# Patient Record
Sex: MALE | Race: BLACK OR AFRICAN AMERICAN | NOT HISPANIC OR LATINO | URBAN - METROPOLITAN AREA
[De-identification: names, ages, dates, MRNs, and addresses within clinical notes are randomized per-mention and may not be internally consistent; named-entity substitution may affect disease eponyms.]

---

## 2022-01-23 ENCOUNTER — INPATIENT (INPATIENT)
Facility: HOSPITAL | Age: 42
LOS: 8 days | Discharge: HOME | End: 2022-02-01
Attending: INTERNAL MEDICINE | Admitting: INTERNAL MEDICINE
Payer: COMMERCIAL

## 2022-01-23 VITALS
HEART RATE: 130 BPM | TEMPERATURE: 100 F | OXYGEN SATURATION: 100 % | RESPIRATION RATE: 20 BRPM | SYSTOLIC BLOOD PRESSURE: 169 MMHG | DIASTOLIC BLOOD PRESSURE: 91 MMHG

## 2022-01-23 DIAGNOSIS — Z29.9 ENCOUNTER FOR PROPHYLACTIC MEASURES, UNSPECIFIED: ICD-10-CM

## 2022-01-23 DIAGNOSIS — K92.2 GASTROINTESTINAL HEMORRHAGE, UNSPECIFIED: ICD-10-CM

## 2022-01-23 DIAGNOSIS — D62 ACUTE POSTHEMORRHAGIC ANEMIA: ICD-10-CM

## 2022-01-23 DIAGNOSIS — R00.0 TACHYCARDIA, UNSPECIFIED: ICD-10-CM

## 2022-01-23 LAB
ALBUMIN SERPL ELPH-MCNC: 4.1 G/DL — SIGNIFICANT CHANGE UP (ref 3.3–5)
ALP SERPL-CCNC: 41 U/L — SIGNIFICANT CHANGE UP (ref 40–120)
ALT FLD-CCNC: 13 U/L — SIGNIFICANT CHANGE UP (ref 4–41)
ANION GAP SERPL CALC-SCNC: 11 MMOL/L — SIGNIFICANT CHANGE UP (ref 7–14)
APTT BLD: 26.8 SEC — LOW (ref 27–36.3)
AST SERPL-CCNC: 17 U/L — SIGNIFICANT CHANGE UP (ref 4–40)
BASOPHILS # BLD AUTO: 0.05 K/UL — SIGNIFICANT CHANGE UP (ref 0–0.2)
BASOPHILS NFR BLD AUTO: 0.9 % — SIGNIFICANT CHANGE UP (ref 0–2)
BILIRUB SERPL-MCNC: 0.3 MG/DL — SIGNIFICANT CHANGE UP (ref 0.2–1.2)
BLD GP AB SCN SERPL QL: NEGATIVE — SIGNIFICANT CHANGE UP
BUN SERPL-MCNC: 11 MG/DL — SIGNIFICANT CHANGE UP (ref 7–23)
CALCIUM SERPL-MCNC: 8.3 MG/DL — LOW (ref 8.4–10.5)
CHLORIDE SERPL-SCNC: 100 MMOL/L — SIGNIFICANT CHANGE UP (ref 98–107)
CO2 SERPL-SCNC: 27 MMOL/L — SIGNIFICANT CHANGE UP (ref 22–31)
CREAT SERPL-MCNC: 1.28 MG/DL — SIGNIFICANT CHANGE UP (ref 0.5–1.3)
EOSINOPHIL # BLD AUTO: 0.1 K/UL — SIGNIFICANT CHANGE UP (ref 0–0.5)
EOSINOPHIL NFR BLD AUTO: 1.8 % — SIGNIFICANT CHANGE UP (ref 0–6)
FLUAV AG NPH QL: SIGNIFICANT CHANGE UP
FLUBV AG NPH QL: SIGNIFICANT CHANGE UP
GLUCOSE SERPL-MCNC: 101 MG/DL — HIGH (ref 70–99)
HCT VFR BLD CALC: 17.6 % — CRITICAL LOW (ref 39–50)
HGB BLD-MCNC: 5.8 G/DL — CRITICAL LOW (ref 13–17)
IANC: 3.97 K/UL — SIGNIFICANT CHANGE UP (ref 1.5–8.5)
INR BLD: 1.05 RATIO — SIGNIFICANT CHANGE UP (ref 0.88–1.16)
LYMPHOCYTES # BLD AUTO: 0.34 K/UL — LOW (ref 1–3.3)
LYMPHOCYTES # BLD AUTO: 6.3 % — LOW (ref 13–44)
MAGNESIUM SERPL-MCNC: 1.7 MG/DL — SIGNIFICANT CHANGE UP (ref 1.6–2.6)
MCHC RBC-ENTMCNC: 26.4 PG — LOW (ref 27–34)
MCHC RBC-ENTMCNC: 33 GM/DL — SIGNIFICANT CHANGE UP (ref 32–36)
MCV RBC AUTO: 80 FL — SIGNIFICANT CHANGE UP (ref 80–100)
MONOCYTES # BLD AUTO: 0.15 K/UL — SIGNIFICANT CHANGE UP (ref 0–0.9)
MONOCYTES NFR BLD AUTO: 2.7 % — SIGNIFICANT CHANGE UP (ref 2–14)
NEUTROPHILS # BLD AUTO: 4.51 K/UL — SIGNIFICANT CHANGE UP (ref 1.8–7.4)
NEUTROPHILS NFR BLD AUTO: 82.9 % — HIGH (ref 43–77)
NT-PROBNP SERPL-SCNC: 14 PG/ML — SIGNIFICANT CHANGE UP
PLATELET # BLD AUTO: 357 K/UL — SIGNIFICANT CHANGE UP (ref 150–400)
POTASSIUM SERPL-MCNC: 3.4 MMOL/L — LOW (ref 3.5–5.3)
POTASSIUM SERPL-SCNC: 3.4 MMOL/L — LOW (ref 3.5–5.3)
PROT SERPL-MCNC: 6.4 G/DL — SIGNIFICANT CHANGE UP (ref 6–8.3)
PROTHROM AB SERPL-ACNC: 12.1 SEC — SIGNIFICANT CHANGE UP (ref 10.6–13.6)
RBC # BLD: 2.2 M/UL — LOW (ref 4.2–5.8)
RBC # FLD: 18 % — HIGH (ref 10.3–14.5)
RH IG SCN BLD-IMP: POSITIVE — SIGNIFICANT CHANGE UP
RH IG SCN BLD-IMP: POSITIVE — SIGNIFICANT CHANGE UP
RSV RNA NPH QL NAA+NON-PROBE: SIGNIFICANT CHANGE UP
SARS-COV-2 RNA SPEC QL NAA+PROBE: SIGNIFICANT CHANGE UP
SODIUM SERPL-SCNC: 138 MMOL/L — SIGNIFICANT CHANGE UP (ref 135–145)
TROPONIN T, HIGH SENSITIVITY RESULT: 10 NG/L — SIGNIFICANT CHANGE UP
TROPONIN T, HIGH SENSITIVITY RESULT: 10 NG/L — SIGNIFICANT CHANGE UP
WBC # BLD: 5.44 K/UL — SIGNIFICANT CHANGE UP (ref 3.8–10.5)
WBC # FLD AUTO: 5.44 K/UL — SIGNIFICANT CHANGE UP (ref 3.8–10.5)

## 2022-01-23 PROCEDURE — 99223 1ST HOSP IP/OBS HIGH 75: CPT | Mod: GC

## 2022-01-23 PROCEDURE — 99291 CRITICAL CARE FIRST HOUR: CPT | Mod: 25

## 2022-01-23 PROCEDURE — 93010 ELECTROCARDIOGRAM REPORT: CPT

## 2022-01-23 PROCEDURE — 71275 CT ANGIOGRAPHY CHEST: CPT | Mod: 26,MA

## 2022-01-23 RX ORDER — PANTOPRAZOLE SODIUM 20 MG/1
40 TABLET, DELAYED RELEASE ORAL
Refills: 0 | Status: DISCONTINUED | OUTPATIENT
Start: 2022-01-23 | End: 2022-01-26

## 2022-01-23 RX ORDER — ACETAMINOPHEN 500 MG
650 TABLET ORAL EVERY 6 HOURS
Refills: 0 | Status: DISCONTINUED | OUTPATIENT
Start: 2022-01-23 | End: 2022-01-31

## 2022-01-23 RX ORDER — PANTOPRAZOLE SODIUM 20 MG/1
80 TABLET, DELAYED RELEASE ORAL ONCE
Refills: 0 | Status: COMPLETED | OUTPATIENT
Start: 2022-01-23 | End: 2022-01-23

## 2022-01-23 RX ORDER — INFLUENZA VIRUS VACCINE 15; 15; 15; 15 UG/.5ML; UG/.5ML; UG/.5ML; UG/.5ML
0.5 SUSPENSION INTRAMUSCULAR ONCE
Refills: 0 | Status: DISCONTINUED | OUTPATIENT
Start: 2022-01-23 | End: 2022-02-01

## 2022-01-23 RX ORDER — LANOLIN ALCOHOL/MO/W.PET/CERES
3 CREAM (GRAM) TOPICAL AT BEDTIME
Refills: 0 | Status: DISCONTINUED | OUTPATIENT
Start: 2022-01-23 | End: 2022-01-24

## 2022-01-23 RX ADMIN — PANTOPRAZOLE SODIUM 80 MILLIGRAM(S): 20 TABLET, DELAYED RELEASE ORAL at 16:40

## 2022-01-23 NOTE — ED ADULT TRIAGE NOTE - CHIEF COMPLAINT QUOTE
states" I am feeling winded and dizzy on exertion with high heart rate since 1 week and is getting worst" states  HR is increasing to 150 on exertion . denies any pain c/o chest tightness. "

## 2022-01-23 NOTE — H&P ADULT - ATTENDING COMMENTS
41 y.o. with PMH of gastric ulcers, internal hemorrohoids who presents to ED with palpitations and dyspnea x 1 week in the setting of symptomatic anemia in the setting of LGIB. ROS positive for worsening RUTLEDGE and tachycardia, which has made it difficult for him to ambulate. Patient reports that he has had a HA over the past week and consuquently has been taking advil 400-600 mg tablets once a day over the past week.     Patient states that over the past 2 weeks, he notices that he continues to bleed after having a BM, and even feels that blood "squirts out." He states the stool itself appears brown. He has about 1 BM per day. No emesis. He has had several past instances of BRBPR in the past, but he states that past episodes have never persisted this long, i.e. 2 weeks. Denies pain associated with bleeding. Was taking nexium in the past after his diagnosis of ulcers, but no longer taking it. Past colonoscopies have demonstrated no polyps but did reveal internal hemorrhoids.     In the ED, patient was found to be tachycardic with HR of 130s. EKG was consistent with sinus tachycardia with EKG demonstrating diffuse TWI. Troponin remained flat at 10. Hgb was found to be 5.8. Patient states he does not follow with a PCP and is unsure what his baseline hgb is.      #LGIB:   -acute blood loss anemia 2/2 LGIB  -given BRBPB, but unable to r/o contribution by UGIB given hx of ulcers and recent NSAID use.   -continue protonix IV BID  -GI c/s for possible EGD and colonoscopy  -sp 2 units in ED, obtain post transfusion CBC  -CBC q8h    #SOB:   -suspect in setting of symptomatic anemia  -troponin negative  -add on pro-BNP      #DVT ppx:   -hold pharmacologic ppx for now, SCDs 41 y.o. with PMH of gastric ulcers, internal hemorrhoids who presents to ED with palpitations and dyspnea x 1 week in the setting of symptomatic anemia in the setting of LGIB. ROS positive for worsening RUTLEDGE and tachycardia, which has made it difficult for him to ambulate. Patient reports that he has had a HA over the past week and consequently has been taking advil 400-600 mg tablets once a day over the past week.     Patient states that over the past 2 weeks, he notices that he continues to bleed after having a BM, and even feels that blood "squirts out." He states the stool itself appears brown. He has about 1 BM per day. No emesis. He has had several past instances of BRBPR in the past, but he states that past episodes have never persisted this long, i.e. 2 weeks. Denies pain associated with bleeding. Was taking nexium in the past after his diagnosis of ulcers, but no longer taking it. Past colonoscopies have demonstrated no polyps but did reveal internal hemorrhoids.     In the ED, patient was found to be tachycardic with HR of 130s. EKG was consistent with sinus tachycardia with EKG demonstrating diffuse TWI. Troponin remained flat at 10. Hgb was found to be 5.8. Patient states he does not follow with a PCP and is unsure what his baseline hgb is.      #LGIB:   -acute blood loss anemia 2/2 LGIB  -given BRBPB, but unable to r/o contribution by UGIB given hx of ulcers and recent NSAID use.   -continue protonix IV BID  -GI c/s for possible EGD and colonoscopy  -sp 2 units in ED, obtain post transfusion CBC  -CBC q8h    #SOB:   -suspect in setting of symptomatic anemia  -troponin negative  -add on pro-BNP      #DVT ppx:   -hold pharmacologic ppx for now, SCDs

## 2022-01-23 NOTE — H&P ADULT - PROBLEM SELECTOR PLAN 1
Symptomatic anemia with Hgb of 5.9. Likely from GIB, likely lower, but cannot rule out upper GIB. No hx of anemia, and no family hx of anemia.   - 2u pRBC now  - F/u post transfusion CBC, goal Hgb >7  - Trend CBC q8h  - Anemia workup: Check Folate, B12, retic count, haptoglobin. Hold off checking iron studies given currently receiving blood transfusion  - Maintain active T+S

## 2022-01-23 NOTE — H&P ADULT - HISTORY OF PRESENT ILLNESS
42 y/o M with no significant PMH presenting to the ED c/o tachycardia, SOB, and lightheadedness. 40 y/o M with no significant PMH presenting to the ED c/o tachycardia, SOB, and lightheadedness. Over the last 2 weeks, patient has been experiencing increasing fatigue, exercise intolerance and lightheadedness. Pt also noted his HR to be in 100s when it's usually 60s. During the past 2 weeks, patient also noted significant bleeding from rectum every time he has a bowel movement. Per patient, he has an internal hemorrhoid which he has to manually reduce everytime he has a BM. Stools are brown in color and normal in consistency; no straining or pain on defecation. Blood in toilet bowl bright red, no dark blood or clots noted. No blood mixed in stool. Patient also noted to take advil consistently everyday for the past week for headache. Denies abdominal pain, nausea, or vomiting. No hematemesis. Does endorse heartburn symptoms but takes no medications. Patient had colonoscopy in the past for rectal bleeding but says previous scopes have been unremarkable. Also had upper endoscopies in past, most recent in May 2021 which showed erosions, but no ulcers. No cigarette smoking but vapes daily. Occasional alcohol use. No family hx of bleeding disorders, but mother with hx of breast and colon ca and father passed away from MI.     In ED, patient noted to be anemic with hgb to 5.8. Also noted to have sinus tachycardia. EKG showing TWI in lateral leads. CTA negative for PE, clear lungs. Patient receiving 2u PRBCs.

## 2022-01-23 NOTE — ED PROVIDER NOTE - OBJECTIVE STATEMENT
40 y/o M with no significant PMH presenting to the ED c/o tachycardia, SOB, and lightheadedness. Reports over the past week having difficulty ambulating up his stairs (4 stories) secondary to shortness of breath. Today he was unable to make it all the way to the top without his symptoms worsening. He endorses his heart rate increasing to 150 even at rest throughout the week. Denies CP. Denies fever, chills, N/V. Father with MI in his 60s. No hx of blood clots. No recent travel. +vapes 42 y/o M with no significant PMH presenting to the ED c/o tachycardia, SOB, and lightheadedness. Reports over the past week having difficulty ambulating up his stairs (4 stories) secondary to shortness of breath. Today he was unable to make it all the way to the top without his symptoms worsening. He endorses his heart rate increasing to 150 even at rest throughout the week. Denies CP. Denies fever, chills, N/V. Father with MI in his 60s. No hx of blood clots. No recent travel. +madiha    Attendinyo male presents with shortness of breath and decreased exercise tolerance for the past 2 weeks.  also reports elevated heart rate.  usual resting heart rate is 50s.  no fever or chills.  no nausea or vomiting.  no cough or URI symptoms.  tolerating po well.

## 2022-01-23 NOTE — H&P ADULT - NSHPPHYSICALEXAM_GEN_ALL_CORE
PHYSICAL EXAM:  GENERAL: NAD, well-groomed, well-developed  HEAD:  Atraumatic, Normocephalic  EYES: EOMI, PERRLA, conjunctiva and sclera clear  ENMT: No tonsillar erythema, exudates, or enlargement; Moist mucous membranes, Good dentition, No lesions  NECK: Supple, No JVD, Normal thyroid  CHEST/LUNG: Clear to percussion bilaterally; No rales, rhonchi, wheezing, or rubs  HEART: Regular rate and rhythm; No murmurs, rubs, or gallops  ABDOMEN: Soft, Nontender, Nondistended; Bowel sounds present  VASCULAR:  2+ Peripheral Pulses, No clubbing, cyanosis, or edema  LYMPH: No lymphadenopathy noted  SKIN: No rashes or lesions  NERVOUS SYSTEM:  Alert & Oriented X3, Good concentration; Motor Strength 5/5 B/L upper and lower extremities; DTRs 2+ intact and symmetric PHYSICAL EXAM:  GENERAL: NAD, well-groomed, well-developed  HEAD:  Atraumatic, Normocephalic  EYES: EOMI, PERRLA, conjunctiva and sclera clear  ENMT: No tonsillar erythema, exudates, or enlargement; Moist mucous membranes  NECK: Supple, No JVD, Normal thyroid  CHEST/LUNG: Clear to auscultation bilaterally; No rales, rhonchi, wheezing, or rubs  HEART: Tachycardic but normal rhythm; No murmurs, rubs, or gallops  ABDOMEN: Soft, Nontender, Nondistended; Bowel sounds present  VASCULAR:  2+ Peripheral Pulses, No clubbing, cyanosis, or edema  SKIN: No rashes or lesions  NERVOUS SYSTEM:  Alert & Oriented X3  Rectal exam: No external hemorrhoids noted, no bleeding, no fissures

## 2022-01-23 NOTE — H&P ADULT - PROBLEM SELECTOR PLAN 3
Likely in setting of GIB, however TWI in lateral leads on EKG. Father with hx of MI  - Low suspicion for ACS, troponins flat  - Will check pro-BNP  - No need for echo at this time  - Suspect improvement after transfusion

## 2022-01-23 NOTE — H&P ADULT - NSHPLABSRESULTS_GEN_ALL_CORE
01-23    138  |  100  |  11  ----------------------------<  101<H>  3.4<L>   |  27  |  1.28    Ca    8.3<L>      23 Jan 2022 15:14  Mg     1.70     01-23    TPro  6.4  /  Alb  4.1  /  TBili  0.3  /  DBili  x   /  AST  17  /  ALT  13  /  AlkPhos  41  01-23    Magnesium, Serum: 1.70 mg/dL (01-23-22 @ 15:14)        PT/INR - ( 23 Jan 2022 15:14 )   PT: 12.1 sec;   INR: 1.05 ratio         PTT - ( 23 Jan 2022 15:14 )  PTT:26.8 sec                                        5.8    5.44  )-----------( 357      ( 23 Jan 2022 15:14 )             17.6     CAPILLARY BLOOD GLUCOSE

## 2022-01-23 NOTE — ED PROVIDER NOTE - CLINICAL SUMMARY MEDICAL DECISION MAKING FREE TEXT BOX
42 y/o M with no significant PMH to the ED c/o SOB, palpitations, lightheadedness. Patient is tachycardic upon arrival. EKG with diffuse TWI. Concern for PE vs other cardiac pathology. Plan for labs and CTA to evaluate for PE. In setting of negative workup will likely CDU vs admit for echo and further eval with TWI. Jun Ozuna, DO PGY3

## 2022-01-23 NOTE — H&P ADULT - PROBLEM SELECTOR PLAN 4
DVT ppx: Hold off for now given GIB  Diet: CLD in case need for scope  Code: Full  Dispo: likely home pending improvement

## 2022-01-23 NOTE — PATIENT PROFILE ADULT - FALL HARM RISK - UNIVERSAL INTERVENTIONS
Bed in lowest position, wheels locked, appropriate side rails in place/Call bell, personal items and telephone in reach/Instruct patient to call for assistance before getting out of bed or chair/Non-slip footwear when patient is out of bed/[place] to call system/Physically safe environment - no spills, clutter or unnecessary equipment/Purposeful Proactive Rounding/Room/bathroom lighting operational, light cord in reach

## 2022-01-23 NOTE — ED PROVIDER NOTE - PROGRESS NOTE DETAILS
Labs with HGB 5.8, patient endorses significant bleeding from a hemorrhoid and also a gastric ulcer. He endorses using NSAIDs at home. CTA negative for PE. Will transfuse and admit for GI workup and echo in setting of TWI. Jun Ozuna, DO PGY3

## 2022-01-23 NOTE — H&P ADULT - NSHPREVIEWOFSYSTEMS_GEN_ALL_CORE
REVIEW OF SYSTEMS:  CONSTITUTIONAL: No fever, chills, night sweats, or fatigue  EYES: No eye pain, visual disturbances, or discharge  ENMT:  No difficulty hearing, tinnitus, vertigo; No sinus or throat pain  NECK: No pain or stiffness  RESPIRATORY: No cough, wheezing, or hemoptysis; No shortness of breath  CARDIOVASCULAR: No chest pain, palpitations, dizziness, or leg swelling  GASTROINTESTINAL: No abdominal or epigastric pain. No nausea, vomiting, or hematemesis; No diarrhea or constipation. No melena or hematochezia.  GENITOURINARY: No dysuria, frequency, hematuria, or incontinence  NEUROLOGICAL: No headaches, memory loss, loss of strength, numbness, or tremors  SKIN: No itching, burning, rashes, or lesions   LYMPH NODES: No enlarged glands  ENDOCRINE: No heat or cold intolerance; No hair loss  MUSCULOSKELETAL: No joint pain or swelling; No muscle, back, or extremity pain  PSYCHIATRIC: No depression, anxiety, mood swings, or difficulty sleeping  HEME/LYMPH: No easy bruising, or bleeding gums REVIEW OF SYSTEMS:  CONSTITUTIONAL: No fever, chills, night sweats, or fatigue  EYES: No eye pain, visual disturbances, or discharge  ENMT:  No difficulty hearing, tinnitus, vertigo; No sinus or throat pain  NECK: No pain or stiffness  RESPIRATORY: No cough, wheezing, or hemoptysis; + SOB on exertion  CARDIOVASCULAR: No chest pain, but + palpitations, dizziness, and lightheadedness. No LE swelling   GASTROINTESTINAL: No abdominal or epigastric pain. No nausea, vomiting, or hematemesis; No diarrhea or constipation. + BRBPR during bowel movements  GENITOURINARY: No dysuria, frequency, hematuria, or incontinence  NEUROLOGICAL: No headaches, memory loss, loss of strength, numbness, or tremors  SKIN: No itching, burning, rashes, or lesions   MUSCULOSKELETAL: No joint pain or swelling; No muscle, back, or extremity pain  HEME/LYMPH: No easy bruising, or bleeding gums

## 2022-01-23 NOTE — ED ADULT NURSE REASSESSMENT NOTE - NS ED NURSE REASSESS COMMENT FT1
Rcvd report from Break RN: pt. endorsing SOB and dyspnea on exertion worsening over the past 2 weeks. Also states persistent tachycardia, which is not his baseline. Came to the ED when he started feeling lightheaded, dizzy and lethargic. Pt. had CT Angio done, pending results.

## 2022-01-23 NOTE — H&P ADULT - ASSESSMENT
41 M with no significant PMHx presenting with SOB, lightheadedness, and palpitations, and noted to be anemic in ED with Hgb 5.8. Source of bleed likely due to lower GIB from internal hemorrhoid however cannot rule out chronic upper GIB at this time. Patient admitted for symptomatic anemia from acute blood loss from GIB.

## 2022-01-23 NOTE — ED PROVIDER NOTE - CARE PLAN
1 Principal Discharge DX:	GI bleed  Secondary Diagnosis:	Anemia  Secondary Diagnosis:	Abnormal EKG

## 2022-01-23 NOTE — H&P ADULT - NSHPSOCIALHISTORY_GEN_ALL_CORE
Social History:    Marital Status:  (   )    (   ) Single    (   )    (  )   Occupation:   Lives with: (  ) alone  (  ) children   (  ) spouse   (  ) parents  (  ) other    Substance Use (street drugs): (  ) never used  (  ) other:  Tobacco Usage:  (   ) never smoked   (   ) former smoker   (   ) current smoker  (     ) pack year  (        ) last cigarette date  Alcohol Usage:  Sexual History: Social History:    Marital Status:  ( X )    (   ) Single    (   )    (  )   Occupation: [employer]  Lives with: (  ) alone  ( X ) children   ( X ) spouse   (  ) parents  (  ) other    Substance Use (street drugs): ( X ) never used  (  ) other:  Tobacco Usage: Does not smoke cigarettes but vapes daily  Alcohol Usage: occasional

## 2022-01-23 NOTE — ED ADULT NURSE NOTE - OBJECTIVE STATEMENT
Break RN: 40 y/o M arrives to E.D. rm 3 c/o worsening SOB over the last 2 wks. PMH: Asthma, currently vapes. Pt a&ox4, ambulatory, endorses SOB, palpitations, and mild CP  with exertion, pt speaking in complete sentences, neg accessory muscle use, neg N/V/D, denies recent fever/cough/body aches, neg edema, denies recent travel. Pt states he tested negative for covid last week. ST to 120s at bedside, Dr. Tate aware. R 18g IV placed to AC. CT in progress. Frequent rounding in place.

## 2022-01-23 NOTE — ED PROVIDER NOTE - NS ED ROS FT
CONST: no fevers, no chills  EYES: no pain, no vision changes  ENT: no sore throat, no ear pain, no change in hearing  CV: no chest pain, no leg swelling, +palpitations  RESP: + shortness of breath, no cough  ABD: no abdominal pain, no nausea, no vomiting, no diarrhea  : no dysuria, no flank pain, no hematuria  MSK: no back pain, no extremity pain  NEURO: no headache or additional neurologic complaints  HEME: no easy bleeding  SKIN:  no rash

## 2022-01-23 NOTE — H&P ADULT - PROBLEM SELECTOR PLAN 2
Per patient, has hx of internal hemorrhoids. Active bleeding for past 2 weeks. Also with hx of endoscopy showing erosions.  - currently receiving pRBC  - GI consult in AM for possible endoscopy/colonoscopy  - 2 large bore IVs  - active T+S  - CBC q8  - will continue IV protonix 40mg BID for now

## 2022-01-24 LAB
ANION GAP SERPL CALC-SCNC: 8 MMOL/L — SIGNIFICANT CHANGE UP (ref 7–14)
BUN SERPL-MCNC: 8 MG/DL — SIGNIFICANT CHANGE UP (ref 7–23)
CALCIUM SERPL-MCNC: 8.6 MG/DL — SIGNIFICANT CHANGE UP (ref 8.4–10.5)
CHLORIDE SERPL-SCNC: 100 MMOL/L — SIGNIFICANT CHANGE UP (ref 98–107)
CO2 SERPL-SCNC: 29 MMOL/L — SIGNIFICANT CHANGE UP (ref 22–31)
CREAT SERPL-MCNC: 1.24 MG/DL — SIGNIFICANT CHANGE UP (ref 0.5–1.3)
FOLATE SERPL-MCNC: 14.1 NG/ML — SIGNIFICANT CHANGE UP (ref 3.1–17.5)
GLUCOSE SERPL-MCNC: 94 MG/DL — SIGNIFICANT CHANGE UP (ref 70–99)
HAPTOGLOB SERPL-MCNC: 107 MG/DL — SIGNIFICANT CHANGE UP (ref 34–200)
HCT VFR BLD CALC: 18.2 % — CRITICAL LOW (ref 39–50)
HCT VFR BLD CALC: 23.9 % — LOW (ref 39–50)
HCT VFR BLD CALC: 24 % — LOW (ref 39–50)
HGB BLD-MCNC: 6.1 G/DL — CRITICAL LOW (ref 13–17)
HGB BLD-MCNC: 7.7 G/DL — LOW (ref 13–17)
HGB BLD-MCNC: 7.8 G/DL — LOW (ref 13–17)
LDH SERPL L TO P-CCNC: 125 U/L — LOW (ref 135–225)
MAGNESIUM SERPL-MCNC: 1.8 MG/DL — SIGNIFICANT CHANGE UP (ref 1.6–2.6)
MCHC RBC-ENTMCNC: 26 PG — LOW (ref 27–34)
MCHC RBC-ENTMCNC: 26.6 PG — LOW (ref 27–34)
MCHC RBC-ENTMCNC: 27.6 PG — SIGNIFICANT CHANGE UP (ref 27–34)
MCHC RBC-ENTMCNC: 32.1 GM/DL — SIGNIFICANT CHANGE UP (ref 32–36)
MCHC RBC-ENTMCNC: 32.6 GM/DL — SIGNIFICANT CHANGE UP (ref 32–36)
MCHC RBC-ENTMCNC: 33.5 GM/DL — SIGNIFICANT CHANGE UP (ref 32–36)
MCV RBC AUTO: 81.1 FL — SIGNIFICANT CHANGE UP (ref 80–100)
MCV RBC AUTO: 81.6 FL — SIGNIFICANT CHANGE UP (ref 80–100)
MCV RBC AUTO: 82.4 FL — SIGNIFICANT CHANGE UP (ref 80–100)
NRBC # BLD: 0 /100 WBCS — SIGNIFICANT CHANGE UP
NRBC # FLD: 0.02 K/UL — HIGH
NRBC # FLD: 0.02 K/UL — HIGH
NRBC # FLD: 0.03 K/UL — HIGH
NT-PROBNP SERPL-SCNC: 24 PG/ML — SIGNIFICANT CHANGE UP
PHOSPHATE SERPL-MCNC: 3.3 MG/DL — SIGNIFICANT CHANGE UP (ref 2.5–4.5)
PLATELET # BLD AUTO: 265 K/UL — SIGNIFICANT CHANGE UP (ref 150–400)
PLATELET # BLD AUTO: 335 K/UL — SIGNIFICANT CHANGE UP (ref 150–400)
PLATELET # BLD AUTO: 345 K/UL — SIGNIFICANT CHANGE UP (ref 150–400)
POTASSIUM SERPL-MCNC: 3.1 MMOL/L — LOW (ref 3.5–5.3)
POTASSIUM SERPL-SCNC: 3.1 MMOL/L — LOW (ref 3.5–5.3)
RBC # BLD: 2.21 M/UL — LOW (ref 4.2–5.8)
RBC # BLD: 2.93 M/UL — LOW (ref 4.2–5.8)
RBC # BLD: 2.93 M/UL — LOW (ref 4.2–5.8)
RBC # BLD: 2.96 M/UL — LOW (ref 4.2–5.8)
RBC # FLD: 16.3 % — HIGH (ref 10.3–14.5)
RBC # FLD: 16.5 % — HIGH (ref 10.3–14.5)
RBC # FLD: 16.6 % — HIGH (ref 10.3–14.5)
RETICS #: 128.6 K/UL — HIGH (ref 25–125)
RETICS/RBC NFR: 4.4 % — HIGH (ref 0.5–2.5)
SODIUM SERPL-SCNC: 137 MMOL/L — SIGNIFICANT CHANGE UP (ref 135–145)
VIT B12 SERPL-MCNC: 602 PG/ML — SIGNIFICANT CHANGE UP (ref 200–900)
WBC # BLD: 5.72 K/UL — SIGNIFICANT CHANGE UP (ref 3.8–10.5)
WBC # BLD: 5.77 K/UL — SIGNIFICANT CHANGE UP (ref 3.8–10.5)
WBC # BLD: 6.26 K/UL — SIGNIFICANT CHANGE UP (ref 3.8–10.5)
WBC # FLD AUTO: 5.72 K/UL — SIGNIFICANT CHANGE UP (ref 3.8–10.5)
WBC # FLD AUTO: 5.77 K/UL — SIGNIFICANT CHANGE UP (ref 3.8–10.5)
WBC # FLD AUTO: 6.26 K/UL — SIGNIFICANT CHANGE UP (ref 3.8–10.5)

## 2022-01-24 PROCEDURE — 99233 SBSQ HOSP IP/OBS HIGH 50: CPT | Mod: GC

## 2022-01-24 PROCEDURE — 99223 1ST HOSP IP/OBS HIGH 75: CPT | Mod: GC

## 2022-01-24 RX ORDER — LANOLIN ALCOHOL/MO/W.PET/CERES
3 CREAM (GRAM) TOPICAL ONCE
Refills: 0 | Status: COMPLETED | OUTPATIENT
Start: 2022-01-24 | End: 2022-01-24

## 2022-01-24 RX ORDER — LANOLIN ALCOHOL/MO/W.PET/CERES
3 CREAM (GRAM) TOPICAL AT BEDTIME
Refills: 0 | Status: DISCONTINUED | OUTPATIENT
Start: 2022-01-24 | End: 2022-02-01

## 2022-01-24 RX ORDER — SOD SULF/SODIUM/NAHCO3/KCL/PEG
4000 SOLUTION, RECONSTITUTED, ORAL ORAL ONCE
Refills: 0 | Status: COMPLETED | OUTPATIENT
Start: 2022-01-24 | End: 2022-01-24

## 2022-01-24 RX ORDER — POTASSIUM CHLORIDE 20 MEQ
40 PACKET (EA) ORAL EVERY 4 HOURS
Refills: 0 | Status: COMPLETED | OUTPATIENT
Start: 2022-01-24 | End: 2022-01-24

## 2022-01-24 RX ADMIN — Medication 40 MILLIEQUIVALENT(S): at 12:08

## 2022-01-24 RX ADMIN — Medication 650 MILLIGRAM(S): at 18:42

## 2022-01-24 RX ADMIN — Medication 3 MILLIGRAM(S): at 20:59

## 2022-01-24 RX ADMIN — Medication 4000 MILLILITER(S): at 14:14

## 2022-01-24 RX ADMIN — Medication 10 MILLIGRAM(S): at 20:46

## 2022-01-24 RX ADMIN — Medication 650 MILLIGRAM(S): at 19:12

## 2022-01-24 RX ADMIN — PANTOPRAZOLE SODIUM 40 MILLIGRAM(S): 20 TABLET, DELAYED RELEASE ORAL at 17:35

## 2022-01-24 RX ADMIN — Medication 40 MILLIEQUIVALENT(S): at 16:57

## 2022-01-24 RX ADMIN — Medication 650 MILLIGRAM(S): at 01:12

## 2022-01-24 RX ADMIN — Medication 650 MILLIGRAM(S): at 10:09

## 2022-01-24 RX ADMIN — Medication 3 MILLIGRAM(S): at 00:06

## 2022-01-24 RX ADMIN — PANTOPRAZOLE SODIUM 40 MILLIGRAM(S): 20 TABLET, DELAYED RELEASE ORAL at 05:16

## 2022-01-24 RX ADMIN — Medication 650 MILLIGRAM(S): at 10:40

## 2022-01-24 RX ADMIN — Medication 650 MILLIGRAM(S): at 00:12

## 2022-01-24 NOTE — PROGRESS NOTE ADULT - SUBJECTIVE AND OBJECTIVE BOX
***INCOMPLETE***    Neville Ramsey, PGY-1  Internal Medicine  Pager: [phone], [facility]: [phone]    PROGRESS NOTE:     SUBJECTIVE / OVERNIGHT EVENTS: No acute events overnight. ROS negative for fever, chills, cough, SOB, chest pain, nausea, vomiting, diarrhea, constipation, dysuria.    MEDICATIONS  (STANDING):  influenza   Vaccine 0.5 milliLiter(s) IntraMuscular once  pantoprazole  Injectable 40 milliGRAM(s) IV Push two times a day    MEDICATIONS  (PRN):  acetaminophen     Tablet .. 650 milliGRAM(s) Oral every 6 hours PRN Temp greater or equal to 38C (100.4F), Mild Pain (1 - 3), Moderate Pain (4 - 6), Severe Pain (7 - 10)  melatonin 3 milliGRAM(s) Oral at bedtime PRN Insomnia      CAPILLARY BLOOD GLUCOSE        I&O's Summary      PHYSICAL EXAM:  Vital Signs Last 24 Hrs  T(C): 36.8 (24 Jan 2022 06:45), Max: 37.7 (23 Jan 2022 14:15)  T(F): 98.2 (24 Jan 2022 06:45), Max: 99.9 (23 Jan 2022 14:15)  HR: 87 (24 Jan 2022 06:45) (71 - 130)  BP: 145/72 (24 Jan 2022 06:45) (126/65 - 169/91)  BP(mean): --  RR: 16 (24 Jan 2022 06:45) (13 - 21)  SpO2: 100% (24 Jan 2022 06:45) (100% - 100%)    CONSTITUTIONAL: NAD, well-developed  RESPIRATORY: Normal respiratory effort; lungs are clear to auscultation bilaterally  CARDIOVASCULAR: Regular rate and rhythm, normal S1 and S2, no murmur/rub/gallop; No lower extremity edema; radial pulses are 2+ bilaterally  ABDOMEN: Nontender to palpation, no rebound/guarding, nondistended, bowel soudns present  MUSCLOSKELETAL: no clubbing or cyanosis of digits; no joint swelling   PSYCH: A+O to person, place, and time; affect appropriate    LABS:                        6.1    5.72  )-----------( 265      ( 24 Jan 2022 02:25 )             18.2     01-23    138  |  100  |  11  ----------------------------<  101<H>  3.4<L>   |  27  |  1.28    Ca    8.3<L>      23 Jan 2022 15:14  Mg     1.70     01-23    TPro  6.4  /  Alb  4.1  /  TBili  0.3  /  DBili  x   /  AST  17  /  ALT  13  /  AlkPhos  41  01-23    PT/INR - ( 23 Jan 2022 15:14 )   PT: 12.1 sec;   INR: 1.05 ratio         PTT - ( 23 Jan 2022 15:14 )  PTT:26.8 sec             Neville Ramsey, PGY-1  Internal Medicine  Pager: [phone], J: [phone]    PROGRESS NOTE:     SUBJECTIVE / OVERNIGHT EVENTS: No acute events overnight. Pt has no complaints this morning, has had no further bleeding episodes. ROS negative for fever, chills, cough, SOB, chest pain, nausea, vomiting, dysuria. No BMs since arrival.     MEDICATIONS  (STANDING):  influenza   Vaccine 0.5 milliLiter(s) IntraMuscular once  pantoprazole  Injectable 40 milliGRAM(s) IV Push two times a day    MEDICATIONS  (PRN):  acetaminophen     Tablet .. 650 milliGRAM(s) Oral every 6 hours PRN Temp greater or equal to 38C (100.4F), Mild Pain (1 - 3), Moderate Pain (4 - 6), Severe Pain (7 - 10)  melatonin 3 milliGRAM(s) Oral at bedtime PRN Insomnia      CAPILLARY BLOOD GLUCOSE        I&O's Summary      PHYSICAL EXAM:  Vital Signs Last 24 Hrs  T(C): 36.8 (24 Jan 2022 06:45), Max: 37.7 (23 Jan 2022 14:15)  T(F): 98.2 (24 Jan 2022 06:45), Max: 99.9 (23 Jan 2022 14:15)  HR: 87 (24 Jan 2022 06:45) (71 - 130)  BP: 145/72 (24 Jan 2022 06:45) (126/65 - 169/91)  BP(mean): --  RR: 16 (24 Jan 2022 06:45) (13 - 21)  SpO2: 100% (24 Jan 2022 06:45) (100% - 100%)    GENERAL: NAD, well-groomed, well-developed  HEAD:  Atraumatic, Normocephalic  EYES: EOMI, PERRLA, conjunctiva and sclera clear  ENMT: No tonsillar erythema, exudates, or enlargement; Moist mucous membranes  NECK: Supple, No JVD  CHEST/LUNG: CTAB; No rales, rhonchi, wheezing, or rubs  HEART: RRR; No murmurs, rubs, or gallops  ABDOMEN: Soft, Nontender, Nondistended; Bowel sounds present, no rebound/guarding  VASCULAR:  2+ Peripheral Pulses, No clubbing, cyanosis, or edema  SKIN: No rashes or lesions  NERVOUS SYSTEM:  Alert & Oriented X3    LABS:                        6.1    5.72  )-----------( 265      ( 24 Jan 2022 02:25 )             18.2     01-23    138  |  100  |  11  ----------------------------<  101<H>  3.4<L>   |  27  |  1.28    Ca    8.3<L>      23 Jan 2022 15:14  Mg     1.70     01-23    TPro  6.4  /  Alb  4.1  /  TBili  0.3  /  DBili  x   /  AST  17  /  ALT  13  /  AlkPhos  41  01-23    PT/INR - ( 23 Jan 2022 15:14 )   PT: 12.1 sec;   INR: 1.05 ratio         PTT - ( 23 Jan 2022 15:14 )  PTT:26.8 sec

## 2022-01-24 NOTE — PROGRESS NOTE ADULT - ASSESSMENT
41 M with no significant PMHx presenting with SOB, lightheadedness, and palpitations, and noted to be anemic in ED with Hgb 5.8. Source of bleed likely due to lower GIB from internal hemorrhoid however cannot rule out chronic upper GIB at this time. Patient admitted for symptomatic anemia from acute blood loss from GIB.  40 yo man with no significant a/w SOB, lightheadedness, and palpitations iso anemia likely 2/2 LGIB from internal hemorrhoid, however cannot r/o UGIB. Pending EGD/colonoscopy on 1/25/22.

## 2022-01-24 NOTE — PROGRESS NOTE ADULT - PROBLEM SELECTOR PLAN 1
Symptomatic anemia with Hgb of 5.9. Likely from GIB, likely lower, but cannot rule out upper GIB. No hx of anemia, and no family hx of anemia.   - 2u pRBC now  - F/u post transfusion CBC, goal Hgb >7  - Trend CBC q8h  - Anemia workup: Check Folate, B12, retic count, haptoglobin. Hold off checking iron studies given currently receiving blood transfusion  - Maintain active T+S Symptomatic anemia likely 2/2 LGIB iso known internal hemorrhoids, though cannot rule out UGIB given hx of gastric ulcers. Reports family history of anemia. s/p 3U pRBCs with increase in Hb to 7.8. No evidence of hemolysis, folate, or B12 deficiency on labs  - trend CBC q12h as he is now HD stable, has had no further bleeding episodes  - maintain active T+S, 2 large bore IVs  - c/w Protonix 40mg bid  - f/u results of coloscopy/EGD, to be performed 1/25/22

## 2022-01-24 NOTE — PROGRESS NOTE ADULT - PROBLEM SELECTOR PLAN 2
Per patient, has hx of internal hemorrhoids. Active bleeding for past 2 weeks. Also with hx of endoscopy showing erosions.  - currently receiving pRBC  - GI consult in AM for possible endoscopy/colonoscopy  - 2 large bore IVs  - active T+S  - CBC q8  - will continue IV protonix 40mg BID for now Per patient, has hx of internal hemorrhoids. Active bleeding for past 2 weeks. Also with hx of endoscopy showing erosions.  - plan as above

## 2022-01-24 NOTE — PROGRESS NOTE ADULT - PROBLEM SELECTOR PLAN 3
Likely in setting of GIB, however TWI in lateral leads on EKG. Father with hx of MI  - Low suspicion for ACS, troponins flat  - Will check pro-BNP  - No need for echo at this time  - Suspect improvement after transfusion DVT ppx: no DVT ppx necessary, low risk patient  Diet: CLD, NPO after midnight  Code: Full  Dispo: likely home

## 2022-01-24 NOTE — CONSULT NOTE ADULT - ASSESSMENT
#. Rectal bleeding likely 2/2 hemorrhoids vs rectal fissure vs rectal ulcer vs diverticular/meckel's diverticular bleeding vs angioectasia less likely malignancy and less likely UGIB but cannot r/o  #. h/o Gastric ulcers s/p 2 clips  - Covid19 negative 1/23    Recommendations:  - Plan for EGD/Colon tomorrow  - CLD today 1/24  - Prep to be ordered by GI to begin at 1700  - NPO after midnight   - PPI IV BID  - Check AM CBC   - Trend Hgb, Transfuse if Hgb < 7     #. Rectal bleeding likely 2/2 hemorrhoids vs rectal fissure vs rectal ulcer vs diverticular/meckel's diverticular bleeding vs angioectasia less likely malignancy and less likely UGIB but cannot r/o  #. h/o Gastric ulcers s/p 2 clips  - Covid19 negative 1/23    Recommendations:  - Plan for EGD/Colon tomorrow  - CLD today 1/24  - Prep to be ordered by GI to begin at 1200  - NPO after midnight   - PPI IV BID  - Check AM CBC, BMP; correct abnormalities  - Trend Hgb, Transfuse if Hgb < 7  - Maintain active T&S  - Ensure adequate hydration  - Rest of care per primary    Thank you for involving us in this patient's care.    Seen and discussed with Attending, Dr. Jean Gardner.    Mona Escobedo MD  Gastroenterology/Hepatology Fellow, PGY-V    NON-URGENT CONSULTS:  Please email [email or web address]  [email or web address]  AT NIGHT AND ON WEEKENDS:  Contact on-call GI fellow via answering service ([phone]) from 5pm-8am and on weekends/holidays  MONDAY-FRIDAY 8AM-5PM:  Pager# [phone] ([facility])  GI Phone# [phone] ([facility]) JAVIER RODRIGUEZ is a 41y Male with h/o bleeding gastric ulcers x2 s/p clips (2013), internal hemorrhoids, and recurrent rectal bleeding who initially presented to the ED with complaints of cecilio BRBPR and decreased exercise tolerated c/b weakness and fatigue, GI consulted for rectal bleeding.    #. Rectal bleeding likely 2/2 hemorrhoids vs rectal fissure vs rectal ulcer vs diverticular/meckel's diverticular bleeding vs angioectasia vs IBD less likely malignancy and less likely UGIB but cannot r/o  #. h/o Bleeding gastric ulcers x2 s/p 2 clips (2013)   - Covid19 negative 1/23    Recommendations:  - Plan for EGD/Colon tomorrow  - CLD today 1/24  - Prep toordered by GI to begin at 1200  - NPO after midnight   - PPI IV BID  - Check AM CBC, BMP; correct abnormalities  - Trend Hgb, Transfuse if Hgb < 7  - Maintain active T&S  - Ensure adequate hydration  - Rest of care per primary    Thank you for involving us in this patient's care.    Seen and discussed with Attending, Dr. Jean Gardner.    Mona Escobedo MD  Gastroenterology/Hepatology Fellow, PGY-V    NON-URGENT CONSULTS:  Please email [email or web address] [place]  [email or web address]  AT NIGHT AND ON WEEKENDS:  Contact on-call GI fellow via answering service ([phone]) from 5pm-8am and on weekends/holidays  MONDAY-FRIDAY 8AM-5PM:  Pager# [phone] ([facility])  GI Phone# [phone] ([facility])

## 2022-01-24 NOTE — CONSULT NOTE ADULT - SUBJECTIVE AND OBJECTIVE BOX
Chief Complaint:  Patient is a 41y old  Male who presents with a chief complaint of GIB (24 Jan 2022 09:50)      HPI:JAVIER RODRIGUEZ is a 41y Male with h/o bleeding gastric ulcers x2 s/p clips (2013), internal hemorrhoids, and recurrent rectal bleeding who initially presented to the ED with complaints of cecilio BRBPR and decreased exercise tolerated c/b weakness and fatigue, GI consulted for rectal bleeding.    Patient states he has been having progressive fatigue, weakness, and reduced exercise tolerance for 1-2 weeks and a few days of BRBPR. Says he has brown soft/regular stool followed by a rush of bright red blood into the toilet. No pain. Says when he had one BM, he felt something come out from his rectum which he states was his hemorrhoids so he pushed it back in. Denies n/v/d or constipation, no abdominal or rectal pain. Occasionally has gerd for which he takes nexium prn. No f/c. Drinks 2-3 shots of alcohol on the weekend, no drug use, no cigarette smoking; vapes. Mother had colon cancer dx age 58-60s.    Mr Rodriguez has had 3 colonoscopies in the past for similar rectal bleeding. His first episode was at the age of 21. For all three he says he had a colonoscopy and was told to eat more fiber and try a sitz bath. He had an EGD 2013 for heart burn/rectal bleeding and had 2 bleeding gastric ulcers for which he received 2 clips and was also noted to have erosions, per patient. Had another EGD 5/2021 for rectal bleeding, he states, and was told he had erosions.     He is HDS and noted to have Hgb 5.8 on admission and given 2 units of blood. On my SHERRIE, there was brown stool, no hemorrhoids noted and no pain. GI consulted for further recommendations.     PMHX/PSHX:  No pertinent past medical history      Allergies:  No Known Allergies      Home Medications: reviewed  Hospital Medications:  acetaminophen     Tablet .. 650 milliGRAM(s) Oral every 6 hours PRN  bisacodyl 10 milliGRAM(s) Oral once  influenza   Vaccine 0.5 milliLiter(s) IntraMuscular once  melatonin 3 milliGRAM(s) Oral at bedtime PRN  pantoprazole  Injectable 40 milliGRAM(s) IV Push two times a day  polyethylene glycol/electrolyte Solution. 4000 milliLiter(s) Oral once      Social History:   Tob: Denies; vapes  EtOH: weekends, 2-3 shots  Illicit Drugs: Denies    Family history:    Colon Cancer Mother age 58-60s    ROS:   Complete and normal except as mentioned above.    PHYSICAL EXAM:   Vital Signs:  Vital Signs Last 24 Hrs  T(C): 36.7 (24 Jan 2022 10:31), Max: 37.7 (23 Jan 2022 14:15)  T(F): 98 (24 Jan 2022 10:31), Max: 99.9 (23 Jan 2022 14:15)  HR: 86 (24 Jan 2022 10:31) (71 - 130)  BP: 137/72 (24 Jan 2022 10:31) (126/65 - 169/91)  BP(mean): --  RR: 17 (24 Jan 2022 10:31) (13 - 21)  SpO2: 100% (24 Jan 2022 10:31) (100% - 100%)  Daily Height in cm: 180.34 (23 Jan 2022 20:50)    Daily     GENERAL: no acute distress  NEURO: alert  HEENT: anicteric sclera, no conjunctival pallor appreciated  CHEST: no respiratory distress, no accessory muscle use  CARDIAC: regular rate, rhythm  ABDOMEN: soft, non-tender, non-distended, no rebound or guarding  SHERRIE: Brown stool, no fissures, or hemorrhoids noted  EXTREMITIES: warm, well perfused, no edema  SKIN: no lesions noted    LABS: reviewed                        7.8    6.26  )-----------( 335      ( 24 Jan 2022 09:22 )             23.9     01-24    137  |  100  |  8   ----------------------------<  94  3.1<L>   |  29  |  1.24    Ca    8.6      24 Jan 2022 09:22  Phos  3.3     01-24  Mg     1.80     01-24    TPro  6.4  /  Alb  4.1  /  TBili  0.3  /  DBili  x   /  AST  17  /  ALT  13  /  AlkPhos  41  01-23    LIVER FUNCTIONS - ( 23 Jan 2022 15:14 )  Alb: 4.1 g/dL / Pro: 6.4 g/dL / ALK PHOS: 41 U/L / ALT: 13 U/L / AST: 17 U/L / GGT: x               Diagnostic Studies: see sunrise for full report         Chief Complaint:  Patient is a 41y old  Male who presents with a chief complaint of GIB (24 Jan 2022 09:50)      HPI:JAVIER RODRIGUEZ is a 41y Male with h/o bleeding gastric ulcers x2 s/p clips (2013), internal hemorrhoids, and recurrent rectal bleeding who initially presented to the ED with complaints of cecilio BRBPR and decreased exercise tolerated c/b weakness and fatigue, GI consulted for rectal bleeding.    Patient states he has been having progressive fatigue, weakness, and reduced exercise tolerance for 1-2 weeks and a few days of BRBPR. Says he has brown soft/regular stool followed by a rush of bright red blood into the toilet. No pain. Says when he had one BM, he felt something come out from his rectum which he states was his hemorrhoids so he pushed it back in. Denies n/v/d or constipation, no abdominal or rectal pain. Occasionally has gerd for which he takes nexium prn. No f/c. Drinks 2-3 shots of alcohol on the weekend, no drug use, no cigarette smoking; vapes. Mother had colon cancer dx age 58-60s.    Mr Rodriguez has had 3 colonoscopies in the past for similar rectal bleeding. His first episode was at the age of 21. For all three he says he had a colonoscopy and was told to eat more fiber and try a sitz bath. He had an EGD 2013 for heart burn/rectal bleeding and had 2 bleeding gastric ulcers for which he received 2 clips and was also noted to have erosions, per patient. Had another EGD 5/2021 for rectal bleeding, he states, and was told he had erosions.     He is HDS and noted to have Hgb 5.8 on admission and given 2 units of blood. On my SHERRIE, there was brown stool, no hemorrhoids noted and no pain. GI consulted for further recommendations.     PMHX/PSHX:    Prior GI bleed  Hemorrhoids  Gastric ulcer  Status post colonoscopy  No pertinent past medical history      Allergies:  No Known Allergies      Home Medications: reviewed  Hospital Medications:  acetaminophen     Tablet .. 650 milliGRAM(s) Oral every 6 hours PRN  bisacodyl 10 milliGRAM(s) Oral once  influenza   Vaccine 0.5 milliLiter(s) IntraMuscular once  melatonin 3 milliGRAM(s) Oral at bedtime PRN  pantoprazole  Injectable 40 milliGRAM(s) IV Push two times a day  polyethylene glycol/electrolyte Solution. 4000 milliLiter(s) Oral once      Social History:   Tob: Denies; vapes  EtOH: weekends, 2-3 shots  Illicit Drugs: Denies    Family history:    Colon Cancer Mother age 58-60s    ROS:   Complete and normal except as mentioned above.    PHYSICAL EXAM:   Vital Signs:  Vital Signs Last 24 Hrs  T(C): 36.7 (24 Jan 2022 10:31), Max: 37.7 (23 Jan 2022 14:15)  T(F): 98 (24 Jan 2022 10:31), Max: 99.9 (23 Jan 2022 14:15)  HR: 86 (24 Jan 2022 10:31) (71 - 130)  BP: 137/72 (24 Jan 2022 10:31) (126/65 - 169/91)  BP(mean): --  RR: 17 (24 Jan 2022 10:31) (13 - 21)  SpO2: 100% (24 Jan 2022 10:31) (100% - 100%)  Daily Height in cm: 180.34 (23 Jan 2022 20:50)    Daily     GENERAL: no acute distress  NEURO: alert  HEENT: anicteric sclera, no conjunctival pallor appreciated  NECK: supple  CHEST: no respiratory distress, no accessory muscle use  CARDIAC: regular rate, rhythm  ABDOMEN: soft, non-tender, non-distended, no rebound or guarding  SHERRIE: Brown stool, no fissures, or hemorrhoids noted  EXTREMITIES: warm, well perfused, no edema  SKIN: no lesions noted  PSYCH: Normal affect    LABS: reviewed                        7.8    6.26  )-----------( 335      ( 24 Jan 2022 09:22 )             23.9     01-24    137  |  100  |  8   ----------------------------<  94  3.1<L>   |  29  |  1.24    Ca    8.6      24 Jan 2022 09:22  Phos  3.3     01-24  Mg     1.80     01-24    TPro  6.4  /  Alb  4.1  /  TBili  0.3  /  DBili  x   /  AST  17  /  ALT  13  /  AlkPhos  41  01-23    LIVER FUNCTIONS - ( 23 Jan 2022 15:14 )  Alb: 4.1 g/dL / Pro: 6.4 g/dL / ALK PHOS: 41 U/L / ALT: 13 U/L / AST: 17 U/L / GGT: x               Diagnostic Studies: see sunrise for full report

## 2022-01-24 NOTE — CONSULT NOTE ADULT - CONSULT REASON
Rectal bleeding Rectal bleeding    #. Rectal bleeding likely 2/2 hemorrhoids vs rectal fissure vs rectal ulcer vs diverticular/meckel's diverticular bleeding vs angioectasia less likely malignancy and less likely UGIB but cannot r/o  #. h/o Bleeding gastric ulcers x2 s/p 2 clips (2013)   - Covid19 negative 1/23

## 2022-01-25 LAB
ANION GAP SERPL CALC-SCNC: 8 MMOL/L — SIGNIFICANT CHANGE UP (ref 7–14)
BASOPHILS # BLD AUTO: 0.02 K/UL — SIGNIFICANT CHANGE UP (ref 0–0.2)
BASOPHILS NFR BLD AUTO: 0.4 % — SIGNIFICANT CHANGE UP (ref 0–2)
BUN SERPL-MCNC: 8 MG/DL — SIGNIFICANT CHANGE UP (ref 7–23)
CALCIUM SERPL-MCNC: 8.3 MG/DL — LOW (ref 8.4–10.5)
CHLORIDE SERPL-SCNC: 101 MMOL/L — SIGNIFICANT CHANGE UP (ref 98–107)
CO2 SERPL-SCNC: 28 MMOL/L — SIGNIFICANT CHANGE UP (ref 22–31)
CREAT SERPL-MCNC: 1.23 MG/DL — SIGNIFICANT CHANGE UP (ref 0.5–1.3)
EOSINOPHIL # BLD AUTO: 0.11 K/UL — SIGNIFICANT CHANGE UP (ref 0–0.5)
EOSINOPHIL NFR BLD AUTO: 2.2 % — SIGNIFICANT CHANGE UP (ref 0–6)
GLUCOSE SERPL-MCNC: 93 MG/DL — SIGNIFICANT CHANGE UP (ref 70–99)
HCT VFR BLD CALC: 20.6 % — CRITICAL LOW (ref 39–50)
HCT VFR BLD CALC: 22.8 % — LOW (ref 39–50)
HGB BLD-MCNC: 7 G/DL — CRITICAL LOW (ref 13–17)
HGB BLD-MCNC: 7.3 G/DL — LOW (ref 13–17)
IANC: 3.54 K/UL — SIGNIFICANT CHANGE UP (ref 1.5–8.5)
IMM GRANULOCYTES NFR BLD AUTO: 0.4 % — SIGNIFICANT CHANGE UP (ref 0–1.5)
LYMPHOCYTES # BLD AUTO: 0.83 K/UL — LOW (ref 1–3.3)
LYMPHOCYTES # BLD AUTO: 16.9 % — SIGNIFICANT CHANGE UP (ref 13–44)
MAGNESIUM SERPL-MCNC: 1.9 MG/DL — SIGNIFICANT CHANGE UP (ref 1.6–2.6)
MCHC RBC-ENTMCNC: 26.2 PG — LOW (ref 27–34)
MCHC RBC-ENTMCNC: 27 PG — SIGNIFICANT CHANGE UP (ref 27–34)
MCHC RBC-ENTMCNC: 32 GM/DL — SIGNIFICANT CHANGE UP (ref 32–36)
MCHC RBC-ENTMCNC: 34 GM/DL — SIGNIFICANT CHANGE UP (ref 32–36)
MCV RBC AUTO: 79.5 FL — LOW (ref 80–100)
MCV RBC AUTO: 81.7 FL — SIGNIFICANT CHANGE UP (ref 80–100)
MONOCYTES # BLD AUTO: 0.39 K/UL — SIGNIFICANT CHANGE UP (ref 0–0.9)
MONOCYTES NFR BLD AUTO: 7.9 % — SIGNIFICANT CHANGE UP (ref 2–14)
NEUTROPHILS # BLD AUTO: 3.54 K/UL — SIGNIFICANT CHANGE UP (ref 1.8–7.4)
NEUTROPHILS NFR BLD AUTO: 72.2 % — SIGNIFICANT CHANGE UP (ref 43–77)
NRBC # BLD: 0 /100 WBCS — SIGNIFICANT CHANGE UP
NRBC # BLD: 0 /100 WBCS — SIGNIFICANT CHANGE UP
NRBC # FLD: 0 K/UL — SIGNIFICANT CHANGE UP
NRBC # FLD: 0 K/UL — SIGNIFICANT CHANGE UP
PHOSPHATE SERPL-MCNC: 3.4 MG/DL — SIGNIFICANT CHANGE UP (ref 2.5–4.5)
PLATELET # BLD AUTO: 322 K/UL — SIGNIFICANT CHANGE UP (ref 150–400)
PLATELET # BLD AUTO: 332 K/UL — SIGNIFICANT CHANGE UP (ref 150–400)
POTASSIUM SERPL-MCNC: 3.5 MMOL/L — SIGNIFICANT CHANGE UP (ref 3.5–5.3)
POTASSIUM SERPL-SCNC: 3.5 MMOL/L — SIGNIFICANT CHANGE UP (ref 3.5–5.3)
RBC # BLD: 2.59 M/UL — LOW (ref 4.2–5.8)
RBC # BLD: 2.79 M/UL — LOW (ref 4.2–5.8)
RBC # FLD: 16.3 % — HIGH (ref 10.3–14.5)
RBC # FLD: 16.8 % — HIGH (ref 10.3–14.5)
SODIUM SERPL-SCNC: 137 MMOL/L — SIGNIFICANT CHANGE UP (ref 135–145)
WBC # BLD: 4.91 K/UL — SIGNIFICANT CHANGE UP (ref 3.8–10.5)
WBC # BLD: 5.4 K/UL — SIGNIFICANT CHANGE UP (ref 3.8–10.5)
WBC # FLD AUTO: 4.91 K/UL — SIGNIFICANT CHANGE UP (ref 3.8–10.5)
WBC # FLD AUTO: 5.4 K/UL — SIGNIFICANT CHANGE UP (ref 3.8–10.5)

## 2022-01-25 PROCEDURE — 43235 EGD DIAGNOSTIC BRUSH WASH: CPT | Mod: GC

## 2022-01-25 PROCEDURE — 99233 SBSQ HOSP IP/OBS HIGH 50: CPT | Mod: GC

## 2022-01-25 PROCEDURE — 45378 DIAGNOSTIC COLONOSCOPY: CPT | Mod: GC

## 2022-01-25 RX ADMIN — PANTOPRAZOLE SODIUM 40 MILLIGRAM(S): 20 TABLET, DELAYED RELEASE ORAL at 05:37

## 2022-01-25 RX ADMIN — Medication 650 MILLIGRAM(S): at 08:05

## 2022-01-25 RX ADMIN — PANTOPRAZOLE SODIUM 40 MILLIGRAM(S): 20 TABLET, DELAYED RELEASE ORAL at 18:37

## 2022-01-25 RX ADMIN — Medication 650 MILLIGRAM(S): at 22:51

## 2022-01-25 RX ADMIN — Medication 3 MILLIGRAM(S): at 20:49

## 2022-01-25 RX ADMIN — Medication 650 MILLIGRAM(S): at 23:51

## 2022-01-25 RX ADMIN — Medication 650 MILLIGRAM(S): at 07:00

## 2022-01-25 NOTE — PROVIDER CONTACT NOTE (CRITICAL VALUE NOTIFICATION) - ACTION/TREATMENT ORDERED:
MD notified; 1 unit of PRBC's will be ordered. Nursing care continued
MD made aware. MD to order another unit of PRBC

## 2022-01-25 NOTE — PROGRESS NOTE ADULT - ASSESSMENT
42 yo man with no significant a/w SOB, lightheadedness, and palpitations iso anemia likely 2/2 LGIB from internal hemorrhoid, however cannot r/o UGIB. Pending EGD/colonoscopy on 1/25/22. 42 yo man w/history of multiple prior lower GI bleeds a/w SOB, lightheadedness, and palpitations iso anemia likely 2/2 LGIB from internal hemorrhoid, however cannot r/o UGIB. Pending EGD/colonoscopy on 1/25/22.

## 2022-01-25 NOTE — PROGRESS NOTE ADULT - PROBLEM SELECTOR PLAN 3
DVT ppx: no DVT ppx necessary, low risk patient  Diet: CLD, NPO after midnight  Code: Full  Dispo: likely home DVT ppx: no pharmacologic DVT ppx necessary, low risk patient who is actively bleeding  Diet: CLD, NPO until EGD/colonoscopy  Code: Full  Dispo: likely home

## 2022-01-25 NOTE — PROGRESS NOTE ADULT - SUBJECTIVE AND OBJECTIVE BOX
***INCOMPLETE***    Neville Ramsey, PGY-1  Internal Medicine  Pager: [phone], [facility]: [phone]    PROGRESS NOTE:     SUBJECTIVE / OVERNIGHT EVENTS: No acute events overnight. ROS negative for fever, chills, cough, SOB, chest pain, nausea, vomiting, diarrhea, constipation, dysuria.    MEDICATIONS  (STANDING):  influenza   Vaccine 0.5 milliLiter(s) IntraMuscular once  pantoprazole  Injectable 40 milliGRAM(s) IV Push two times a day    MEDICATIONS  (PRN):  acetaminophen     Tablet .. 650 milliGRAM(s) Oral every 6 hours PRN Temp greater or equal to 38C (100.4F), Mild Pain (1 - 3), Moderate Pain (4 - 6), Severe Pain (7 - 10)  melatonin 3 milliGRAM(s) Oral at bedtime PRN Sleep      CAPILLARY BLOOD GLUCOSE        I&O's Summary      PHYSICAL EXAM:  Vital Signs Last 24 Hrs  T(C): 36.6 (25 Jan 2022 05:33), Max: 36.9 (25 Jan 2022 02:51)  T(F): 97.9 (25 Jan 2022 05:33), Max: 98.4 (25 Jan 2022 02:51)  HR: 86 (25 Jan 2022 05:33) (78 - 90)  BP: 154/69 (25 Jan 2022 05:33) (127/74 - 154/69)  BP(mean): --  RR: 18 (25 Jan 2022 05:33) (16 - 18)  SpO2: 100% (25 Jan 2022 05:33) (100% - 100%)    CONSTITUTIONAL: NAD, well-developed  RESPIRATORY: Normal respiratory effort; lungs are clear to auscultation bilaterally  CARDIOVASCULAR: Regular rate and rhythm, normal S1 and S2, no murmur/rub/gallop; No lower extremity edema; radial pulses are 2+ bilaterally  ABDOMEN: Nontender to palpation, no rebound/guarding, nondistended, bowel soudns present  MUSCLOSKELETAL: no clubbing or cyanosis of digits; no joint swelling   PSYCH: A+O to person, place, and time; affect appropriate    LABS:                        7.7    5.77  )-----------( 345      ( 24 Jan 2022 17:46 )             24.0     01-24    137  |  100  |  8   ----------------------------<  94  3.1<L>   |  29  |  1.24    Ca    8.6      24 Jan 2022 09:22  Phos  3.3     01-24  Mg     1.80     01-24    TPro  6.4  /  Alb  4.1  /  TBili  0.3  /  DBili  x   /  AST  17  /  ALT  13  /  AlkPhos  41  01-23    PT/INR - ( 23 Jan 2022 15:14 )   PT: 12.1 sec;   INR: 1.05 ratio         PTT - ( 23 Jan 2022 15:14 )  PTT:26.8 sec             Neville Ramsey, PGY-1  Internal Medicine  Pager: [phone], J: [phone]    PROGRESS NOTE:     SUBJECTIVE / OVERNIGHT EVENTS: Overnight, pt able to finsih ~half of GoLytely. He reports BMs were initially soft and bloody, and progressed to watery dairrhea. Bis BMs have remained bloody overnight. Despite this, he feels well. He is just hungry and eager to have EGD/c-scope performed. Pt also reports he has a lesion on his head that he would like outpatient referral for. ROS negative for fever, chills, cough, SOB, chest pain, nausea, vomiting, dysuria.    MEDICATIONS  (STANDING):  influenza   Vaccine 0.5 milliLiter(s) IntraMuscular once  pantoprazole  Injectable 40 milliGRAM(s) IV Push two times a day    MEDICATIONS  (PRN):  acetaminophen     Tablet .. 650 milliGRAM(s) Oral every 6 hours PRN Temp greater or equal to 38C (100.4F), Mild Pain (1 - 3), Moderate Pain (4 - 6), Severe Pain (7 - 10)  melatonin 3 milliGRAM(s) Oral at bedtime PRN Sleep      CAPILLARY BLOOD GLUCOSE        I&O's Summary      PHYSICAL EXAM:  Vital Signs Last 24 Hrs  T(C): 36.6 (25 Jan 2022 05:33), Max: 36.9 (25 Jan 2022 02:51)  T(F): 97.9 (25 Jan 2022 05:33), Max: 98.4 (25 Jan 2022 02:51)  HR: 86 (25 Jan 2022 05:33) (78 - 90)  BP: 154/69 (25 Jan 2022 05:33) (127/74 - 154/69)  RR: 18 (25 Jan 2022 05:33) (16 - 18)  SpO2: 100% (25 Jan 2022 05:33) (100% - 100%)    GENERAL: NAD, well-groomed, well-developed  HEAD:  Atraumatic, Normocephalic, +cystic lesion on scalp  EYES: EOMI, PERRLA, conjunctiva and sclera clear  ENMT: No tonsillar erythema, exudates, or enlargement; Moist mucous membranes  NECK: Supple, No JVD  CHEST/LUNG: CTAB; No rales, rhonchi, wheezing, or rubs  HEART: RRR; No murmurs, rubs, or gallops  ABDOMEN: Soft, Nontender, Nondistended; no rebound/guarding  VASCULAR:  2+ Peripheral Pulses, No clubbing, cyanosis, or edema  SKIN: No rashes or lesions appreciated  NERVOUS SYSTEM:  Alert & Oriented X3 w/o focal deficits    LABS:                        7.7    5.77  )-----------( 345      ( 24 Jan 2022 17:46 )             24.0     01-24    137  |  100  |  8   ----------------------------<  94  3.1<L>   |  29  |  1.24    Ca    8.6      24 Jan 2022 09:22  Phos  3.3     01-24  Mg     1.80     01-24    TPro  6.4  /  Alb  4.1  /  TBili  0.3  /  DBili  x   /  AST  17  /  ALT  13  /  AlkPhos  41  01-23    PT/INR - ( 23 Jan 2022 15:14 )   PT: 12.1 sec;   INR: 1.05 ratio         PTT - ( 23 Jan 2022 15:14 )  PTT:26.8 sec

## 2022-01-25 NOTE — PROGRESS NOTE ADULT - PROBLEM SELECTOR PLAN 2
Per patient, has hx of internal hemorrhoids. Active bleeding for past 2 weeks. Also with hx of endoscopy showing erosions.  - plan as above

## 2022-01-25 NOTE — PROGRESS NOTE ADULT - PROBLEM SELECTOR PLAN 1
Symptomatic anemia likely 2/2 LGIB iso known internal hemorrhoids, though cannot rule out UGIB given hx of gastric ulcers. Reports family history of anemia. s/p 3U pRBCs with increase in Hb to 7.8. No evidence of hemolysis, folate, or B12 deficiency on labs  - trend CBC q12h as he is now HD stable, has had no further bleeding episodes  - maintain active T+S, 2 large bore IVs  - c/w Protonix 40mg bid  - f/u results of coloscopy/EGD, to be performed 1/25/22 Symptomatic anemia likely 2/2 LGIB iso known internal hemorrhoids, though cannot rule out UGIB given hx of gastric ulcers. Reports family history of anemia. s/p 3U pRBCs with increase in Hb to 7.0.   No evidence of hemolysis, folate, or B12 deficiency on labs  - trend CBC q12h   - maintain active T+S, 2 large bore IVs  - c/w Protonix 40mg bid  - transfuse 1 U pRBCs on 1/25/22 given Hb 7.0 on AM labs  - f/u results of coloscopy/EGD, to be performed 1/25/22

## 2022-01-25 NOTE — ASU PREOP CHECKLIST - WEIGHT IN KG
Birth control patch  Historical Provider, MD   albuterol sulfate  (90 Base) MCG/ACT inhaler Inhale 2 puffs into the lungs every 6 hours as needed for Shortness of Breath  WAYNE Malik   ibuprofen (ADVIL;MOTRIN) 800 MG tablet TAKE 1 TABLET BY MOUTH 4 TIMES DAILY AS NEEDED FOR PAIN  Jeremy Ibarra MD   Multiple Vitamin (MULTI-VITAMIN DAILY PO) Take by mouth  Historical Provider, MD       Social History     Tobacco Use    Smoking status: Never Smoker    Smokeless tobacco: Never Used   Substance Use Topics    Alcohol use: No    Drug use: No            PHYSICAL EXAMINATION:  [ INSTRUCTIONS:  \"[x]\" Indicates a positive item  \"[]\" Indicates a negative item  -- DELETE ALL ITEMS NOT EXAMINED]  Vital Signs: (As obtained by patient/caregiver or practitioner observation)    Blood pressure-  Heart rate-    Respiratory rate-    Temperature-  Pulse oximetry-     Patient does not have the equipment to obtain 3 vital signs. Constitutional: [x] Appears well-developed and well-nourished [x] No apparent distress      [] Abnormal-   Mental status  [x] Alert and awake  [x] Oriented to person/place/time [x]Able to follow commands      Eyes:  EOM    [x]  Normal  [] Abnormal-  Sclera  [x]  Normal  [] Abnormal -         Discharge [x]  None visible  [] Abnormal -    HENT:   [x] Normocephalic, atraumatic.   [] Abnormal   [x] Mouth/Throat: Mucous membranes are moist.     External Ears [x] Normal  [] Abnormal-     Neck: [x] No visualized mass     Pulmonary/Chest: [x] Respiratory effort normal.  [x] No visualized signs of difficulty breathing or respiratory distress        [] Abnormal-      Musculoskeletal:   [x] Normal gait with no signs of ataxia         [x] Normal range of motion of neck        [] Abnormal-       Neurological:        [x] No Facial Asymmetry (Cranial nerve 7 motor function) (limited exam to video visit)          [x] No gaze palsy        [] Abnormal-         Skin:        [x] No significant exanthematous a Virtual Visit (video visit) encounter to address concerns as mentioned above. A caregiver was present when appropriate. Due to this being a TeleHealth encounter (During YYQTB-24 public health emergency), evaluation of the following organ systems was limited: Vitals/Constitutional/EENT/Resp/CV/GI//MS/Neuro/Skin/Heme-Lymph-Imm. Pursuant to the emergency declaration under the [address] [place] [address] and the [employer] and [employer] Act, this Virtual Visit was conducted with patient's (and/or legal guardian's) consent, to reduce the patient's risk of exposure to COVID-19 and provide necessary medical care. The patient (and/or legal guardian) has also been advised to contact this office for worsening conditions or problems, and seek emergency medical treatment and/or call 911 if deemed necessary. Patient identification was verified at the start of the visit: Yes    Services were provided through a video synchronous discussion virtually to substitute for in-person clinic visit. Patient and provider were located at their individual homes. --Kennedi Roa MD on 7/9/2020 at 8:56 PM    An electronic signature was used to authenticate this note. 107.9

## 2022-01-26 LAB
ANION GAP SERPL CALC-SCNC: 5 MMOL/L — LOW (ref 7–14)
BLD GP AB SCN SERPL QL: NEGATIVE — SIGNIFICANT CHANGE UP
BUN SERPL-MCNC: 10 MG/DL — SIGNIFICANT CHANGE UP (ref 7–23)
CALCIUM SERPL-MCNC: 7.8 MG/DL — LOW (ref 8.4–10.5)
CHLORIDE SERPL-SCNC: 104 MMOL/L — SIGNIFICANT CHANGE UP (ref 98–107)
CO2 SERPL-SCNC: 28 MMOL/L — SIGNIFICANT CHANGE UP (ref 22–31)
CREAT SERPL-MCNC: 1.24 MG/DL — SIGNIFICANT CHANGE UP (ref 0.5–1.3)
GLUCOSE SERPL-MCNC: 111 MG/DL — HIGH (ref 70–99)
HCT VFR BLD CALC: 22.5 % — LOW (ref 39–50)
HCT VFR BLD CALC: 24.2 % — LOW (ref 39–50)
HCT VFR BLD CALC: 26.2 % — LOW (ref 39–50)
HGB BLD-MCNC: 7.3 G/DL — LOW (ref 13–17)
HGB BLD-MCNC: 8 G/DL — LOW (ref 13–17)
HGB BLD-MCNC: 8.6 G/DL — LOW (ref 13–17)
MAGNESIUM SERPL-MCNC: 1.9 MG/DL — SIGNIFICANT CHANGE UP (ref 1.6–2.6)
MCHC RBC-ENTMCNC: 26.8 PG — LOW (ref 27–34)
MCHC RBC-ENTMCNC: 27.1 PG — SIGNIFICANT CHANGE UP (ref 27–34)
MCHC RBC-ENTMCNC: 27.2 PG — SIGNIFICANT CHANGE UP (ref 27–34)
MCHC RBC-ENTMCNC: 32.4 GM/DL — SIGNIFICANT CHANGE UP (ref 32–36)
MCHC RBC-ENTMCNC: 32.8 GM/DL — SIGNIFICANT CHANGE UP (ref 32–36)
MCHC RBC-ENTMCNC: 33.1 GM/DL — SIGNIFICANT CHANGE UP (ref 32–36)
MCV RBC AUTO: 81.6 FL — SIGNIFICANT CHANGE UP (ref 80–100)
MCV RBC AUTO: 82 FL — SIGNIFICANT CHANGE UP (ref 80–100)
MCV RBC AUTO: 84 FL — SIGNIFICANT CHANGE UP (ref 80–100)
NRBC # BLD: 0 /100 WBCS — SIGNIFICANT CHANGE UP
NRBC # FLD: 0 K/UL — SIGNIFICANT CHANGE UP
PHOSPHATE SERPL-MCNC: 2.9 MG/DL — SIGNIFICANT CHANGE UP (ref 2.5–4.5)
PLATELET # BLD AUTO: 293 K/UL — SIGNIFICANT CHANGE UP (ref 150–400)
PLATELET # BLD AUTO: 297 K/UL — SIGNIFICANT CHANGE UP (ref 150–400)
PLATELET # BLD AUTO: 335 K/UL — SIGNIFICANT CHANGE UP (ref 150–400)
POTASSIUM SERPL-MCNC: 3.5 MMOL/L — SIGNIFICANT CHANGE UP (ref 3.5–5.3)
POTASSIUM SERPL-SCNC: 3.5 MMOL/L — SIGNIFICANT CHANGE UP (ref 3.5–5.3)
RBC # BLD: 2.68 M/UL — LOW (ref 4.2–5.8)
RBC # BLD: 2.95 M/UL — LOW (ref 4.2–5.8)
RBC # BLD: 3.21 M/UL — LOW (ref 4.2–5.8)
RBC # FLD: 15.5 % — HIGH (ref 10.3–14.5)
RBC # FLD: 15.6 % — HIGH (ref 10.3–14.5)
RBC # FLD: 16.3 % — HIGH (ref 10.3–14.5)
RH IG SCN BLD-IMP: POSITIVE — SIGNIFICANT CHANGE UP
SODIUM SERPL-SCNC: 137 MMOL/L — SIGNIFICANT CHANGE UP (ref 135–145)
WBC # BLD: 6.03 K/UL — SIGNIFICANT CHANGE UP (ref 3.8–10.5)
WBC # BLD: 6.13 K/UL — SIGNIFICANT CHANGE UP (ref 3.8–10.5)
WBC # BLD: 6.51 K/UL — SIGNIFICANT CHANGE UP (ref 3.8–10.5)
WBC # FLD AUTO: 6.03 K/UL — SIGNIFICANT CHANGE UP (ref 3.8–10.5)
WBC # FLD AUTO: 6.13 K/UL — SIGNIFICANT CHANGE UP (ref 3.8–10.5)
WBC # FLD AUTO: 6.51 K/UL — SIGNIFICANT CHANGE UP (ref 3.8–10.5)

## 2022-01-26 PROCEDURE — 99233 SBSQ HOSP IP/OBS HIGH 50: CPT | Mod: GC

## 2022-01-26 PROCEDURE — 99232 SBSQ HOSP IP/OBS MODERATE 35: CPT | Mod: GC

## 2022-01-26 PROCEDURE — 99253 IP/OBS CNSLTJ NEW/EST LOW 45: CPT | Mod: GC

## 2022-01-26 RX ORDER — PANTOPRAZOLE SODIUM 20 MG/1
40 TABLET, DELAYED RELEASE ORAL
Refills: 0 | Status: DISCONTINUED | OUTPATIENT
Start: 2022-01-26 | End: 2022-02-01

## 2022-01-26 RX ORDER — LANOLIN ALCOHOL/MO/W.PET/CERES
3 CREAM (GRAM) TOPICAL ONCE
Refills: 0 | Status: COMPLETED | OUTPATIENT
Start: 2022-01-26 | End: 2022-01-27

## 2022-01-26 RX ADMIN — PANTOPRAZOLE SODIUM 40 MILLIGRAM(S): 20 TABLET, DELAYED RELEASE ORAL at 05:35

## 2022-01-26 RX ADMIN — Medication 650 MILLIGRAM(S): at 18:10

## 2022-01-26 RX ADMIN — Medication 2 MILLIGRAM(S): at 18:53

## 2022-01-26 RX ADMIN — Medication 650 MILLIGRAM(S): at 08:58

## 2022-01-26 RX ADMIN — Medication 650 MILLIGRAM(S): at 17:10

## 2022-01-26 RX ADMIN — Medication 650 MILLIGRAM(S): at 07:58

## 2022-01-26 NOTE — PROGRESS NOTE ADULT - SUBJECTIVE AND OBJECTIVE BOX
Chief Complaint:  Patient is a 41y old  Male who presents with a chief complaint of GIB (26 Jan 2022 11:37)      Interval Events: s/p EGD /colon yesterday with clear yellow stool throughout entire colon - no old blood or fresh blood, no diverticulosis, polyps, masses or angioectasia. Noted to have very large hemorrhoids with ulceration and friability  - had recurrent episode of bleeding  - again states when he bleeds he has a normal formed bowel movement that is followed by complete liquid bright red blood that drips out of his anus  - also notes a hemorrhoid prolapses out that he has to push back in      Hospital Medications:  acetaminophen     Tablet .. 650 milliGRAM(s) Oral every 6 hours PRN  influenza   Vaccine 0.5 milliLiter(s) IntraMuscular once  melatonin 3 milliGRAM(s) Oral at bedtime PRN  melatonin 3 milliGRAM(s) Oral once  pantoprazole    Tablet 40 milliGRAM(s) Oral before breakfast      PMHX/PSHX:  No pertinent past medical history            ROS:     General:  No weight loss, fevers, chills, night sweats, fatigue   Eyes:  No vision changes  ENT:  No sore throat, pain, runny nose  CV:  No chest pain, palpitations, dizziness   Resp:  No SOB, cough, wheezing  GI:  See HPI  :  No burning with urination, hematuria  Muscle:  No pain, weakness  Neuro:  No weakness/tingling, memory problems  Psych:  No fatigue, insomnia, mood problems, depression  Heme:  No easy bruisability  Skin:  No rash, edema      PHYSICAL EXAM:     GENERAL:  Well developed, no distress  HEENT:  NC/AT,  conjunctivae clear, sclera anicteric  CHEST:  Full & symmetric excursion, no increased effort w/ respirations  HEART:  Regular rhythm & rate  ABDOMEN:  Soft, non-tender, non-distended  EXTREMITIES:  no LE  edema  SKIN:  No rash/erythema/ecchymoses/petechiae/wounds/jaundice  NEURO:  Alert, oriented    Vital Signs:  Vital Signs Last 24 Hrs  T(C): 37.1 (26 Jan 2022 17:13), Max: 37.2 (26 Jan 2022 12:59)  T(F): 98.7 (26 Jan 2022 17:13), Max: 99 (26 Jan 2022 12:59)  HR: 80 (26 Jan 2022 17:13) (71 - 99)  BP: 130/80 (26 Jan 2022 17:13) (108/65 - 145/70)  BP(mean): --  RR: 17 (26 Jan 2022 17:13) (14 - 20)  SpO2: 100% (26 Jan 2022 17:13) (99% - 100%)  Daily     Daily     LABS:                        8.0    6.13  )-----------( 297      ( 26 Jan 2022 07:21 )             24.2     01-26    137  |  104  |  10  ----------------------------<  111<H>  3.5   |  28  |  1.24    Ca    7.8<L>      26 Jan 2022 07:21  Phos  2.9     01-26  Mg     1.90     01-26                Imaging:           Chief Complaint:  Patient is a 41y old  Male who presents with a chief complaint of GIB (26 Jan 2022 11:37)      Interval Events:   s/p EGD /colon yesterday with clear yellow stool throughout entire colon - no old blood or fresh blood, no diverticulosis, polyps, masses or angioectasia. Noted to have very large hemorrhoids with ulceration and friability  - had recurrent episode of bleeding  - again states when he bleeds he has a normal formed bowel movement that is followed by complete liquid bright red blood that drips out of his anus  - also notes a hemorrhoid prolapses out that he has to push back in      Hospital Medications:  acetaminophen     Tablet .. 650 milliGRAM(s) Oral every 6 hours PRN  influenza   Vaccine 0.5 milliLiter(s) IntraMuscular once  melatonin 3 milliGRAM(s) Oral at bedtime PRN  melatonin 3 milliGRAM(s) Oral once  pantoprazole    Tablet 40 milliGRAM(s) Oral before breakfast      PMHX/PSHX:  No pertinent past medical history            ROS:     General:  No weight loss, fevers, chills, night sweats, fatigue   Eyes:  No vision changes  ENT:  No sore throat, pain, runny nose  CV:  No chest pain, palpitations, dizziness   Resp:  No SOB, cough, wheezing  GI:  See HPI  :  No burning with urination, hematuria  Muscle:  No pain, weakness  Neuro:  No weakness/tingling, memory problems  Psych:  No fatigue, insomnia, mood problems, depression  Heme:  No easy bruisability  Skin:  No rash, edema      PHYSICAL EXAM:     GENERAL:  Well developed, no distress  HEENT:  NC/AT,  conjunctivae clear, sclera anicteric  CHEST:  Full & symmetric excursion, no increased effort w/ respirations  HEART:  Regular rhythm & rate  ABDOMEN:  Soft, non-tender, non-distended  EXTREMITIES:  no LE  edema  SKIN:  No rash/erythema/ecchymoses/petechiae/wounds/jaundice  NEURO:  Alert, oriented    Vital Signs:  Vital Signs Last 24 Hrs  T(C): 37.1 (26 Jan 2022 17:13), Max: 37.2 (26 Jan 2022 12:59)  T(F): 98.7 (26 Jan 2022 17:13), Max: 99 (26 Jan 2022 12:59)  HR: 80 (26 Jan 2022 17:13) (71 - 99)  BP: 130/80 (26 Jan 2022 17:13) (108/65 - 145/70)  BP(mean): --  RR: 17 (26 Jan 2022 17:13) (14 - 20)  SpO2: 100% (26 Jan 2022 17:13) (99% - 100%)  Daily     Daily     LABS:                        8.0    6.13  )-----------( 297      ( 26 Jan 2022 07:21 )             24.2     01-26    137  |  104  |  10  ----------------------------<  111<H>  3.5   |  28  |  1.24    Ca    7.8<L>      26 Jan 2022 07:21  Phos  2.9     01-26  Mg     1.90     01-26                Imaging:

## 2022-01-26 NOTE — CONSULT NOTE ADULT - ASSESSMENT
41M presenting with GI bleed and grade 3 internal hemorrhoid.    Recommendations:  - Large volume hematochezia unlikely from internal hemorrhoids.  - However, patient can follow-up as outpatient with Dr. Quintero in a nonurgent basis.  - Resuscitation per primary team  - Please recontact colorectal surgery with further questions.    Discussed with attending surgeon Dr. Shantanu Quintero.    SABRINA Alford, PGY-2  [facility]  A Team Surgery  [id number]

## 2022-01-26 NOTE — CONSULT NOTE ADULT - ATTENDING COMMENTS
Rectal bleeding w/ anemia and Grade III internal hemorrhoids  -Pt sat on commode and elicited cecilio bleeding form internal hemorrhoids  -I recommended pt undergo formal hemorrhoidectomy  -Risks and benefits were reviewed including bleeding, infection and post procedure pain  -Pt for surgery on 1/31/22  -all questions answered
As above    41 Male h/o recurrent GI bleeds, status post EGDs and colonoscopies with gastric ulcer, hemorrhoids p/w hematochezia after passing brown stool.  Suspect lower GI bleeding.  Monitor CBC, will plan for EGD and colonoscopy tomorrow.

## 2022-01-26 NOTE — PROGRESS NOTE ADULT - SUBJECTIVE AND OBJECTIVE BOX
***INCOMPLETE***    Neville Ramsey, PGY-1  Internal Medicine  Pager: [phone], [facility]: [phone]    PROGRESS NOTE:     SUBJECTIVE / OVERNIGHT EVENTS: No acute events overnight. ROS negative for fever, chills, cough, SOB, chest pain, nausea, vomiting, diarrhea, constipation, dysuria.    MEDICATIONS  (STANDING):  influenza   Vaccine 0.5 milliLiter(s) IntraMuscular once  melatonin 3 milliGRAM(s) Oral once  pantoprazole  Injectable 40 milliGRAM(s) IV Push two times a day    MEDICATIONS  (PRN):  acetaminophen     Tablet .. 650 milliGRAM(s) Oral every 6 hours PRN Temp greater or equal to 38C (100.4F), Mild Pain (1 - 3), Moderate Pain (4 - 6), Severe Pain (7 - 10)  melatonin 3 milliGRAM(s) Oral at bedtime PRN Sleep      CAPILLARY BLOOD GLUCOSE        I&O's Summary      PHYSICAL EXAM:  Vital Signs Last 24 Hrs  T(C): 36.8 (26 Jan 2022 05:20), Max: 37.6 (25 Jan 2022 16:19)  T(F): 98.2 (26 Jan 2022 05:20), Max: 99.7 (25 Jan 2022 16:19)  HR: 85 (26 Jan 2022 05:20) (71 - 99)  BP: 127/69 (26 Jan 2022 05:20) (108/65 - 144/79)  BP(mean): --  RR: 16 (26 Jan 2022 05:20) (14 - 20)  SpO2: 100% (26 Jan 2022 05:20) (99% - 100%)    GENERAL: NAD, well-groomed, well-developed  HEAD:  Atraumatic, Normocephalic, +cystic lesion on scalp  EYES: EOMI, PERRLA, conjunctiva and sclera clear  ENMT: No tonsillar erythema, exudates, or enlargement; Moist mucous membranes  NECK: Supple, No JVD  CHEST/LUNG: CTAB; No rales, rhonchi, wheezing, or rubs  HEART: RRR; No murmurs, rubs, or gallops  ABDOMEN: Soft, Nontender, Nondistended; no rebound/guarding  VASCULAR:  2+ Peripheral Pulses, No clubbing, cyanosis, or edema  SKIN: No rashes or lesions appreciated  NERVOUS SYSTEM:  Alert & Oriented X3 w/o focal deficits    LABS:                        7.3    6.51  )-----------( 293      ( 26 Jan 2022 00:48 )             22.5     01-25    137  |  101  |  8   ----------------------------<  93  3.5   |  28  |  1.23    Ca    8.3<L>      25 Jan 2022 07:20  Phos  3.4     01-25  Mg     1.90     01-25                   Neville Ramsey, PGY-1  Internal Medicine  Pager: [phone], J: [phone]    PROGRESS NOTE:     SUBJECTIVE / OVERNIGHT EVENTS: Overnight, patient reports continued bleeding per rectum - was "squirting out blood" at 3am. He otherwise has no complaints, and ROS is negative for fever, chills, cough, SOB, chest pain, nausea, vomiting, constipation, dysuria.    MEDICATIONS  (STANDING):  influenza   Vaccine 0.5 milliLiter(s) IntraMuscular once  melatonin 3 milliGRAM(s) Oral once  pantoprazole  Injectable 40 milliGRAM(s) IV Push two times a day    MEDICATIONS  (PRN):  acetaminophen     Tablet .. 650 milliGRAM(s) Oral every 6 hours PRN Temp greater or equal to 38C (100.4F), Mild Pain (1 - 3), Moderate Pain (4 - 6), Severe Pain (7 - 10)  melatonin 3 milliGRAM(s) Oral at bedtime PRN Sleep      CAPILLARY BLOOD GLUCOSE        I&O's Summary      PHYSICAL EXAM:  Vital Signs Last 24 Hrs  T(C): 36.8 (26 Jan 2022 05:20), Max: 37.6 (25 Jan 2022 16:19)  T(F): 98.2 (26 Jan 2022 05:20), Max: 99.7 (25 Jan 2022 16:19)  HR: 85 (26 Jan 2022 05:20) (71 - 99)  BP: 127/69 (26 Jan 2022 05:20) (108/65 - 144/79)  BP(mean): --  RR: 16 (26 Jan 2022 05:20) (14 - 20)  SpO2: 100% (26 Jan 2022 05:20) (99% - 100%)    GENERAL: NAD, well-groomed, well-developed  HEAD:  Atraumatic, Normocephalic, +cystic lesion on scalp  EYES: EOMI, PERRLA, conjunctiva and sclera clear  ENMT: Moist mucous membranes  NECK: Supple, No JVD  CHEST/LUNG: CTAB; No rales, rhonchi, wheezing, or rubs  HEART: RRR; No murmurs, rubs, or gallops  ABDOMEN: Soft, Nontender, Nondistended; no rebound/guarding  VASCULAR:  2+ Peripheral Pulses, No clubbing, cyanosis, or edema  SKIN: +cystic lesion on scalp  NERVOUS SYSTEM:  Alert & Oriented X3 w/o focal deficits    LABS:                        7.3    6.51  )-----------( 293      ( 26 Jan 2022 00:48 )             22.5     01-25    137  |  101  |  8   ----------------------------<  93  3.5   |  28  |  1.23    Ca    8.3<L>      25 Jan 2022 07:20  Phos  3.4     01-25  Mg     1.90     01-25

## 2022-01-26 NOTE — CONSULT NOTE ADULT - SUBJECTIVE AND OBJECTIVE BOX
Colorectal Surgery Consult Note  Attending: Dr. Shantanu Quintero  Service: A Team Surgery  [id number]    HPI: 41M w/ no PMHx who presented with tachycardia and BRBPR. Patient states he first started to experience rectal bleeding at the age of 21. He has been following with his gastroenterology who performed colonoscopies and EGD in the past. He was found to have a bleeding gastric ulcer s/p clips (2013). Since then, patient has been experiencing intermittent BRBPR episoded with BMs. he was found to have an internal hemorrhoid for which no interventions has been pursued yet.     During this admission, patient was found to be severely anemic requiring 6pRBCs to date. Colonoscopy and upper endoscopy were performed, neither were able to identify source of bleeding. Colorectal surgery consulted for evaluation of internal hemorrhoid as source of GIB.    PAST MEDICAL & SURGICAL HISTORY:  No pertinent past medical history    ALLERGIES:  shellfish. (Anaphylaxis (Severe))    PHYSICAL EXAM:  General: NAD, resting comfortably  HEENT: NC/AT, EOMI, normal hearing, no oral lesions, no LAD, neck supple  Pulmonary: normal resp effort, CTA-B  Cardiovascular: NSR, no murmurs  Abdominal: soft, ND/NT, no organomegaly  Extremities: WWP, normal strength, no clubbing/cyanosis/edema  Pulses: palpable distal pulses  SHERRIE: no blood appreciated, palpable swelling in right posterior portion    VITAL SIGNS:  Vital Signs Last 24 Hrs  T(C): 37.2 (26 Jan 2022 12:59), Max: 37.6 (25 Jan 2022 16:19)  T(F): 99 (26 Jan 2022 12:59), Max: 99.7 (25 Jan 2022 16:19)  HR: 77 (26 Jan 2022 12:59) (71 - 99)  BP: 128/65 (26 Jan 2022 12:59) (108/65 - 145/70)  BP(mean): --  RR: 18 (26 Jan 2022 12:59) (14 - 20)  SpO2: 100% (26 Jan 2022 12:59) (99% - 100%)    I&O's Summary      LABS:                        8.0    6.13  )-----------( 297      ( 26 Jan 2022 07:21 )             24.2     01-26    137  |  104  |  10  ----------------------------<  111<H>  3.5   |  28  |  1.24    Ca    7.8<L>      26 Jan 2022 07:21  Phos  2.9     01-26  Mg     1.90     01-26

## 2022-01-26 NOTE — PROGRESS NOTE ADULT - PROBLEM SELECTOR PLAN 1
Symptomatic anemia likely 2/2 LGIB iso known internal hemorrhoids, though cannot rule out UGIB given hx of gastric ulcers. Reports family history of anemia. s/p 3U pRBCs with increase in Hb to 7.0.   No evidence of hemolysis, folate, or B12 deficiency on labs  - trend CBC q12h   - maintain active T+S, 2 large bore IVs  - c/w Protonix 40mg bid  - transfuse 1 U pRBCs on 1/25/22 given Hb 7.0 on AM labs  - f/u results of coloscopy/EGD, to be performed 1/25/22 2/2 bleeding internal hemorrhoids. Has received 6 U pRBCs since 1/23, with increase in Hb from 5.8 to 8.0  - trend CBC q8-12h   - maintain active T+S, 2 large bore IVs  - d/c Protonix 40mg bid  - transfuse 1 U pRBCs on 1/25/22 given Hb 7.0 on AM labs  - f/u results of coloscopy/EGD, to be performed 1/25/22 2/2 bleeding internal hemorrhoids on colonoscopy; EGD w/one non-bleeding erosion  Has received 6 U pRBCs since 1/23, with increase in Hb from 5.8 to 8.0  - trend CBC q8-12h   - maintain active T+S, 2 large bore IVs  - d/c IV Protonix 40mg bid, start Protonix 40mg qd  - c/s colorectal surgeon given significant bleeding

## 2022-01-26 NOTE — PROGRESS NOTE ADULT - ASSESSMENT
42 yo man w/history of multiple prior lower GI bleeds a/w SOB, lightheadedness, and palpitations iso anemia likely 2/2 LGIB from internal hemorrhoid, however cannot r/o UGIB. Pending EGD/colonoscopy on 1/25/22. 42 yo man w/history of multiple prior lower GI bleeds a/w SOB, lightheadedness, and palpitations iso anemia 2/2 bleeding internal hemorrhoids; patient continues to have active bleeding requiring daily blood transfusions.

## 2022-01-26 NOTE — PROGRESS NOTE ADULT - ASSESSMENT
JAVIER RODRIGUEZ is a 41y Male with h/o bleeding gastric ulcers x2 s/p clips (2013), internal hemorrhoids, and recurrent rectal bleeding who initially presented to the ED with complaints of cecilio BRBPR and decreased exercise tolerated c/b weakness and fatigue, GI consulted for rectal bleeding.    #. Rectal bleeding likely 2/2 hemorrhoids see on colonoscopy  #. h/o Bleeding gastric ulcers x2 s/p 2 clips (2013)   - Covid19 negative 1/23    Recommendations:  - obtain NM bleeding scan to r/o other sources  - likely hemorrhoids given description of bleeding as well as findings on endoscopies  - if recurrent bleeding tomorrow, will arrange for urgent flex sig unsedated to localize exact source; patient in agreement    Dejah Daigle PGY-5  Gastroenterology Fellow  Pager #[phone] (ALIE) or [phone] (NS)  Available on [employer] Teams.  Please contact on-call GI fellow via answering service ([phone]) after 5pm and before 8am, and on weekends.  JAVIER RODRIGUEZ is a 41y Male with h/o bleeding gastric ulcers x2 s/p clips (2013), internal hemorrhoids, and recurrent rectal bleeding who initially presented to the ED with complaints of cecilio BRBPR and decreased exercise tolerated c/b weakness and fatigue, GI consulted for rectal bleeding.    #. Rectal bleeding likely 2/2 large hemorrhoids seen on colonoscopy  #. h/o Bleeding gastric ulcers x2 s/p 2 clips (2013)   - Covid19 negative 1/23    Recommendations:  - obtain NM bleeding scan to r/o other sources given recurrent bleeding requiring transfusions  - likely hemorrhoids given description of bleeding as well as findings on endoscopies  - if recurrent bleeding tomorrow, will arrange for urgent flex sig unsedated to localize exact source; patient in agreement    Dejah Daigle PGY-5  Gastroenterology Fellow  Pager #[phone] (ALIE) or [phone] (NS)  Available on [employer] Teams.  Please contact on-call GI fellow via answering service ([phone]) after 5pm and before 8am, and on weekends.

## 2022-01-26 NOTE — PROGRESS NOTE ADULT - PROBLEM SELECTOR PLAN 3
DVT ppx: no pharmacologic DVT ppx necessary, low risk patient who is actively bleeding  Diet: CLD, NPO until EGD/colonoscopy  Code: Full  Dispo: likely home DVT ppx: no pharmacologic DVT ppx, low risk patient who is actively bleeding  Diet: regular  Code: Full  Dispo: likely home

## 2022-01-27 LAB
ANION GAP SERPL CALC-SCNC: 6 MMOL/L — LOW (ref 7–14)
BUN SERPL-MCNC: 10 MG/DL — SIGNIFICANT CHANGE UP (ref 7–23)
CALCIUM SERPL-MCNC: 8.4 MG/DL — SIGNIFICANT CHANGE UP (ref 8.4–10.5)
CHLORIDE SERPL-SCNC: 104 MMOL/L — SIGNIFICANT CHANGE UP (ref 98–107)
CO2 SERPL-SCNC: 28 MMOL/L — SIGNIFICANT CHANGE UP (ref 22–31)
CREAT SERPL-MCNC: 1.26 MG/DL — SIGNIFICANT CHANGE UP (ref 0.5–1.3)
GLUCOSE SERPL-MCNC: 100 MG/DL — HIGH (ref 70–99)
HCT VFR BLD CALC: 24 % — LOW (ref 39–50)
HGB BLD-MCNC: 8.2 G/DL — LOW (ref 13–17)
MAGNESIUM SERPL-MCNC: 2 MG/DL — SIGNIFICANT CHANGE UP (ref 1.6–2.6)
MCHC RBC-ENTMCNC: 27.8 PG — SIGNIFICANT CHANGE UP (ref 27–34)
MCHC RBC-ENTMCNC: 34.2 GM/DL — SIGNIFICANT CHANGE UP (ref 32–36)
MCV RBC AUTO: 81.4 FL — SIGNIFICANT CHANGE UP (ref 80–100)
NRBC # BLD: 0 /100 WBCS — SIGNIFICANT CHANGE UP
NRBC # FLD: 0 K/UL — SIGNIFICANT CHANGE UP
PHOSPHATE SERPL-MCNC: 2.9 MG/DL — SIGNIFICANT CHANGE UP (ref 2.5–4.5)
PLATELET # BLD AUTO: 323 K/UL — SIGNIFICANT CHANGE UP (ref 150–400)
POTASSIUM SERPL-MCNC: 3.7 MMOL/L — SIGNIFICANT CHANGE UP (ref 3.5–5.3)
POTASSIUM SERPL-SCNC: 3.7 MMOL/L — SIGNIFICANT CHANGE UP (ref 3.5–5.3)
RBC # BLD: 2.95 M/UL — LOW (ref 4.2–5.8)
RBC # FLD: 15.6 % — HIGH (ref 10.3–14.5)
SODIUM SERPL-SCNC: 138 MMOL/L — SIGNIFICANT CHANGE UP (ref 135–145)
WBC # BLD: 6.02 K/UL — SIGNIFICANT CHANGE UP (ref 3.8–10.5)
WBC # FLD AUTO: 6.02 K/UL — SIGNIFICANT CHANGE UP (ref 3.8–10.5)

## 2022-01-27 PROCEDURE — 45330 DIAGNOSTIC SIGMOIDOSCOPY: CPT | Mod: GC

## 2022-01-27 PROCEDURE — 78278 ACUTE GI BLOOD LOSS IMAGING: CPT | Mod: 26

## 2022-01-27 PROCEDURE — 99233 SBSQ HOSP IP/OBS HIGH 50: CPT | Mod: GC

## 2022-01-27 RX ORDER — HYDROCORTISONE 1 %
1 OINTMENT (GRAM) TOPICAL DAILY
Refills: 0 | Status: DISCONTINUED | OUTPATIENT
Start: 2022-01-27 | End: 2022-01-31

## 2022-01-27 RX ADMIN — PANTOPRAZOLE SODIUM 40 MILLIGRAM(S): 20 TABLET, DELAYED RELEASE ORAL at 06:02

## 2022-01-27 RX ADMIN — Medication 3 MILLIGRAM(S): at 22:20

## 2022-01-27 RX ADMIN — Medication 3 MILLIGRAM(S): at 04:00

## 2022-01-27 NOTE — PROGRESS NOTE ADULT - SUBJECTIVE AND OBJECTIVE BOX
***INCOMPLETE***    Neville Ramsey, PGY-1  Internal Medicine  Pager: [phone], [facility]: [phone]    PROGRESS NOTE:     SUBJECTIVE / OVERNIGHT EVENTS: No acute events overnight. ROS negative for fever, chills, cough, SOB, chest pain, nausea, vomiting, diarrhea, constipation, dysuria.    MEDICATIONS  (STANDING):  influenza   Vaccine 0.5 milliLiter(s) IntraMuscular once  pantoprazole    Tablet 40 milliGRAM(s) Oral before breakfast    MEDICATIONS  (PRN):  acetaminophen     Tablet .. 650 milliGRAM(s) Oral every 6 hours PRN Temp greater or equal to 38C (100.4F), Mild Pain (1 - 3), Moderate Pain (4 - 6), Severe Pain (7 - 10)  melatonin 3 milliGRAM(s) Oral at bedtime PRN Sleep      CAPILLARY BLOOD GLUCOSE        I&O's Summary      PHYSICAL EXAM:  Vital Signs Last 24 Hrs  T(C): 36.7 (27 Jan 2022 06:05), Max: 37.2 (26 Jan 2022 12:59)  T(F): 98 (27 Jan 2022 06:05), Max: 99 (26 Jan 2022 12:59)  HR: 73 (27 Jan 2022 06:05) (73 - 90)  BP: 120/72 (27 Jan 2022 06:05) (120/72 - 145/70)  BP(mean): --  RR: 17 (27 Jan 2022 06:05) (17 - 18)  SpO2: 100% (27 Jan 2022 06:05) (100% - 100%)    GENERAL: NAD, well-groomed, well-developed  HEAD:  Atraumatic, Normocephalic, +cystic lesion on scalp  EYES: EOMI, PERRLA, conjunctiva and sclera clear  ENMT: Moist mucous membranes  NECK: Supple, No JVD  CHEST/LUNG: CTAB; No rales, rhonchi, wheezing, or rubs  HEART: RRR; No murmurs, rubs, or gallops  ABDOMEN: Soft, Nontender, Nondistended; no rebound/guarding  VASCULAR:  2+ Peripheral Pulses, No clubbing, cyanosis, or edema  SKIN: +cystic lesion on scalp  NERVOUS SYSTEM:  Alert & Oriented X3 w/o focal deficits    LABS:                        8.2    6.02  )-----------( 323      ( 27 Jan 2022 06:55 )             24.0     01-26    137  |  104  |  10  ----------------------------<  111<H>  3.5   |  28  |  1.24    Ca    7.8<L>      26 Jan 2022 07:21  Phos  2.9     01-26  Mg     1.90     01-26                   Neville Ramsey, PGY-1  Internal Medicine  Pager: [phone], J: [phone]    PROGRESS NOTE:     SUBJECTIVE / OVERNIGHT EVENTS: No acute events overnight. Pt reports continued bleeding. Otherwise, no acute complaints. ROS negative for fever, chills, cough, SOB, chest pain, nausea, vomiting, diarrhea, constipation, dysuria.    MEDICATIONS  (STANDING):  influenza   Vaccine 0.5 milliLiter(s) IntraMuscular once  pantoprazole    Tablet 40 milliGRAM(s) Oral before breakfast    MEDICATIONS  (PRN):  acetaminophen     Tablet .. 650 milliGRAM(s) Oral every 6 hours PRN Temp greater or equal to 38C (100.4F), Mild Pain (1 - 3), Moderate Pain (4 - 6), Severe Pain (7 - 10)  melatonin 3 milliGRAM(s) Oral at bedtime PRN Sleep      CAPILLARY BLOOD GLUCOSE        I&O's Summary      PHYSICAL EXAM:  Vital Signs Last 24 Hrs  T(C): 36.7 (27 Jan 2022 06:05), Max: 37.2 (26 Jan 2022 12:59)  T(F): 98 (27 Jan 2022 06:05), Max: 99 (26 Jan 2022 12:59)  HR: 73 (27 Jan 2022 06:05) (73 - 90)  BP: 120/72 (27 Jan 2022 06:05) (120/72 - 145/70)  BP(mean): --  RR: 17 (27 Jan 2022 06:05) (17 - 18)  SpO2: 100% (27 Jan 2022 06:05) (100% - 100%)    GENERAL: NAD, well-groomed, well-developed  HEAD:  Atraumatic, Normocephalic, +cystic lesion on scalp  EYES: EOMI, PERRLA, conjunctiva and sclera clear  ENMT: Moist mucous membranes  NECK: Supple, No JVD  CHEST/LUNG: CTAB; No rales, rhonchi, wheezing, or rubs  HEART: RRR; No murmurs, rubs, or gallops  ABDOMEN: Soft, Nontender, Nondistended; no rebound/guarding  VASCULAR:  2+ Peripheral Pulses, No clubbing, cyanosis, or edema  SKIN: +cystic lesion on scalp  NERVOUS SYSTEM:  Alert & Oriented X3 w/o focal deficits    LABS:                        8.2    6.02  )-----------( 323      ( 27 Jan 2022 06:55 )             24.0     01-26    137  |  104  |  10  ----------------------------<  111<H>  3.5   |  28  |  1.24    Ca    7.8<L>      26 Jan 2022 07:21  Phos  2.9     01-26  Mg     1.90     01-26

## 2022-01-27 NOTE — PROGRESS NOTE ADULT - ASSESSMENT
42 yo man w/history of multiple prior lower GI bleeds a/w SOB, lightheadedness, and palpitations iso anemia 2/2 bleeding internal hemorrhoids; patient continues to have active bleeding requiring daily blood transfusions. 40 yo man w/history of multiple prior lower GI bleeds a/w SOB, lightheadedness, and palpitations iso anemia 2/2 bleeding internal hemorrhoids; bleeding is ongoing.

## 2022-01-27 NOTE — CHART NOTE - NSCHARTNOTEFT_GEN_A_CORE
Continues to have BRBPR  Plan for unsedated flex sig today.  Please give 2 tap water enemas.  covid negative 1/23  NPO    Case discussed with Attending, Dr. Jj Pena.    Mona Escobedo MD  Gastroenterology/Hepatology Fellow, PGY-V    NON-URGENT CONSULTS:  Please email [email or web address]  AT NIGHT AND ON WEEKENDS:  Contact on-call GI fellow via answering service ([phone]) from 5pm-8am and on weekends/holidays  MONDAY-FRIDAY 8AM-5PM:  Pager# [phone] ([facility])  GI Phone# [phone] ([facility])

## 2022-01-27 NOTE — PROGRESS NOTE ADULT - PROBLEM SELECTOR PLAN 3
DVT ppx: no pharmacologic DVT ppx, low risk patient who is actively bleeding  Diet: regular  Code: Full  Dispo: likely home

## 2022-01-27 NOTE — PROGRESS NOTE ADULT - PROBLEM SELECTOR PLAN 1
2/2 bleeding internal hemorrhoids on colonoscopy; EGD w/one non-bleeding erosion  Has received 6 U pRBCs since 1/23, with increase in Hb from 5.8 to 8.0  - trend CBC q8-12h   - maintain active T+S, 2 large bore IVs  - d/c IV Protonix 40mg bid, start Protonix 40mg qd  - c/s colorectal surgeon given significant bleeding 2/2 bleeding internal hemorrhoids on colonoscopy; EGD w/one non-bleeding erosion  Flex  Grade 3 inflamed hemorrhoid found on perianal exam  Has received 6 U pRBCs since 1/23, with increase in Hb from 5.8 to 8.0  - trend CBC qd for now, as Hb has been stable  - maintain active T+S, 2 large bore IVs  - c/w Protonix 40mg qd  - appreciate surgery, GI input

## 2022-01-27 NOTE — PROGRESS NOTE ADULT - ASSESSMENT
41M presenting with GI bleed and grade 3 internal hemorrhoid.    Plan:  - Appreciate GI input  - Large volume hematochezia unlikely from internal hemorrhoids  - Patient can follow-up as outpatient with Dr. Quintero in a nonurgent basis  - Resuscitation per primary team    A Team Surgery, [id number]

## 2022-01-27 NOTE — PROGRESS NOTE ADULT - PROBLEM SELECTOR PLAN 2
Per patient, has hx of internal hemorrhoids. Active bleeding for past 2 weeks. Also with hx of endoscopy showing erosions.  - plan as above Per patient, has hx of internal hemorrhoids. Active bleeding since mid 01/2022. Also with hx of endoscopy showing erosions.  - plan as above

## 2022-01-27 NOTE — PROGRESS NOTE ADULT - SUBJECTIVE AND OBJECTIVE BOX
SURGERY DAILY PROGRESS NOTE:     SUBJECTIVE/ROS: Patient seen and examined at     OBJECTIVE:    Vital Signs Last 24 Hrs  T(C): 36.7 (27 Jan 2022 06:05), Max: 37.2 (26 Jan 2022 12:59)  T(F): 98 (27 Jan 2022 06:05), Max: 99 (26 Jan 2022 12:59)  HR: 73 (27 Jan 2022 06:05) (73 - 90)  BP: 120/72 (27 Jan 2022 06:05) (120/72 - 145/70)  BP(mean): --  RR: 17 (27 Jan 2022 06:05) (17 - 18)  SpO2: 100% (27 Jan 2022 06:05) (100% - 100%)    I&O's Detail    Physical Exam:  General: NAD, resting comfortably  HEENT: NC/AT, EOMI, normal hearing, no oral lesions, no LAD, neck supple  Pulmonary: normal resp effort, CTA-B  Cardiovascular: NSR, no murmurs  Abdominal: soft, ND/NT, no organomegaly  Extremities: WWP, normal strength, no clubbing/cyanosis/edema  Pulses: palpable distal pulses  SHERRIE: no blood appreciated, palpable swelling in right posterior portion    LABS:                        8.2    6.02  )-----------( 323      ( 27 Jan 2022 06:55 )             24.0     01-26    137  |  104  |  10  ----------------------------<  111<H>  3.5   |  28  |  1.24    Ca    7.8<L>      26 Jan 2022 07:21  Phos  2.9     01-26  Mg     1.90     01-26                           SURGERY DAILY PROGRESS NOTE:     SUBJECTIVE/ROS: Patient seen and examined at bedside during morning rounds. He reports no abdominal pain at this time, however had one BM this morning followed by blood.    OBJECTIVE:    Vital Signs Last 24 Hrs  T(C): 36.7 (27 Jan 2022 06:05), Max: 37.2 (26 Jan 2022 12:59)  T(F): 98 (27 Jan 2022 06:05), Max: 99 (26 Jan 2022 12:59)  HR: 73 (27 Jan 2022 06:05) (73 - 90)  BP: 120/72 (27 Jan 2022 06:05) (120/72 - 145/70)  BP(mean): --  RR: 17 (27 Jan 2022 06:05) (17 - 18)  SpO2: 100% (27 Jan 2022 06:05) (100% - 100%)    I&O's Detail    Physical Exam:  General: NAD, resting comfortably  Pulmonary: normal resp effort  Cardiovascular: NSR, no murmurs  Abdominal: soft, ND/NT  Extremities: WWP, normal strength, no clubbing/cyanosis/edema  SHERRIE: no blood appreciated    LABS:                        8.2    6.02  )-----------( 323      ( 27 Jan 2022 06:55 )             24.0     01-26    137  |  104  |  10  ----------------------------<  111<H>  3.5   |  28  |  1.24    Ca    7.8<L>      26 Jan 2022 07:21  Phos  2.9     01-26  Mg     1.90     01-26

## 2022-01-28 LAB
ANION GAP SERPL CALC-SCNC: 10 MMOL/L — SIGNIFICANT CHANGE UP (ref 7–14)
BUN SERPL-MCNC: 8 MG/DL — SIGNIFICANT CHANGE UP (ref 7–23)
CALCIUM SERPL-MCNC: 8.3 MG/DL — LOW (ref 8.4–10.5)
CHLORIDE SERPL-SCNC: 102 MMOL/L — SIGNIFICANT CHANGE UP (ref 98–107)
CO2 SERPL-SCNC: 26 MMOL/L — SIGNIFICANT CHANGE UP (ref 22–31)
CREAT SERPL-MCNC: 1.18 MG/DL — SIGNIFICANT CHANGE UP (ref 0.5–1.3)
GLUCOSE SERPL-MCNC: 85 MG/DL — SIGNIFICANT CHANGE UP (ref 70–99)
HCT VFR BLD CALC: 25.7 % — LOW (ref 39–50)
HGB BLD-MCNC: 8.1 G/DL — LOW (ref 13–17)
MAGNESIUM SERPL-MCNC: 1.9 MG/DL — SIGNIFICANT CHANGE UP (ref 1.6–2.6)
MCHC RBC-ENTMCNC: 26.7 PG — LOW (ref 27–34)
MCHC RBC-ENTMCNC: 31.5 GM/DL — LOW (ref 32–36)
MCV RBC AUTO: 84.8 FL — SIGNIFICANT CHANGE UP (ref 80–100)
NRBC # BLD: 0 /100 WBCS — SIGNIFICANT CHANGE UP
NRBC # FLD: 0 K/UL — SIGNIFICANT CHANGE UP
PHOSPHATE SERPL-MCNC: 3.6 MG/DL — SIGNIFICANT CHANGE UP (ref 2.5–4.5)
PLATELET # BLD AUTO: 349 K/UL — SIGNIFICANT CHANGE UP (ref 150–400)
POTASSIUM SERPL-MCNC: 3.7 MMOL/L — SIGNIFICANT CHANGE UP (ref 3.5–5.3)
POTASSIUM SERPL-SCNC: 3.7 MMOL/L — SIGNIFICANT CHANGE UP (ref 3.5–5.3)
RBC # BLD: 3.03 M/UL — LOW (ref 4.2–5.8)
RBC # FLD: 15.8 % — HIGH (ref 10.3–14.5)
SODIUM SERPL-SCNC: 138 MMOL/L — SIGNIFICANT CHANGE UP (ref 135–145)
WBC # BLD: 5.97 K/UL — SIGNIFICANT CHANGE UP (ref 3.8–10.5)
WBC # FLD AUTO: 5.97 K/UL — SIGNIFICANT CHANGE UP (ref 3.8–10.5)

## 2022-01-28 PROCEDURE — 99232 SBSQ HOSP IP/OBS MODERATE 35: CPT | Mod: GC

## 2022-01-28 PROCEDURE — 99231 SBSQ HOSP IP/OBS SF/LOW 25: CPT

## 2022-01-28 RX ORDER — SENNA PLUS 8.6 MG/1
2 TABLET ORAL AT BEDTIME
Refills: 0 | Status: DISCONTINUED | OUTPATIENT
Start: 2022-01-28 | End: 2022-01-31

## 2022-01-28 RX ORDER — POLYETHYLENE GLYCOL 3350 17 G/17G
17 POWDER, FOR SOLUTION ORAL DAILY
Refills: 0 | Status: DISCONTINUED | OUTPATIENT
Start: 2022-01-28 | End: 2022-01-31

## 2022-01-28 RX ADMIN — POLYETHYLENE GLYCOL 3350 17 GRAM(S): 17 POWDER, FOR SOLUTION ORAL at 15:33

## 2022-01-28 RX ADMIN — Medication 3 MILLIGRAM(S): at 22:11

## 2022-01-28 RX ADMIN — Medication 650 MILLIGRAM(S): at 06:25

## 2022-01-28 RX ADMIN — PANTOPRAZOLE SODIUM 40 MILLIGRAM(S): 20 TABLET, DELAYED RELEASE ORAL at 06:14

## 2022-01-28 RX ADMIN — Medication 1 MILLIGRAM(S): at 13:24

## 2022-01-28 RX ADMIN — Medication 1 SUPPOSITORY(S): at 15:33

## 2022-01-28 NOTE — PROGRESS NOTE ADULT - SUBJECTIVE AND OBJECTIVE BOX
persistent intermittent heavy bleeding w/ BMs    Pt was asked to sit on the commode to elicit prolapse and bleeding.  Left lateral internal hemorrhoid prolapsed and active bleeding was appreciated      Objective:    MEDICATIONS  (STANDING):  hydrocortisone hemorrhoidal Suppository 1 Suppository(s) Rectal daily  influenza   Vaccine 0.5 milliLiter(s) IntraMuscular once  LORazepam     Tablet 1 milliGRAM(s) Oral once  pantoprazole    Tablet 40 milliGRAM(s) Oral before breakfast  polyethylene glycol 3350 17 Gram(s) Oral daily  senna 2 Tablet(s) Oral at bedtime    MEDICATIONS  (PRN):  acetaminophen     Tablet .. 650 milliGRAM(s) Oral every 6 hours PRN Temp greater or equal to 38C (100.4F), Mild Pain (1 - 3), Moderate Pain (4 - 6), Severe Pain (7 - 10)  melatonin 3 milliGRAM(s) Oral at bedtime PRN Sleep      Vital Signs Last 24 Hrs  T(C): 36.8 (28 Jan 2022 06:17), Max: 37.3 (27 Jan 2022 15:21)  T(F): 98.3 (28 Jan 2022 06:17), Max: 99.2 (27 Jan 2022 15:21)  HR: 75 (28 Jan 2022 06:17) (75 - 89)  BP: 125/77 (28 Jan 2022 06:17) (109/72 - 180/95)  BP(mean): --  RR: 17 (28 Jan 2022 06:17) (13 - 20)  SpO2: 100% (28 Jan 2022 06:17) (95% - 100%)    I&O's Detail      Daily Height in cm: 180 (27 Jan 2022 14:44)    Daily     LABS:                        8.1    5.97  )-----------( 349      ( 28 Jan 2022 08:21 )             25.7     01-28    138  |  102  |  8   ----------------------------<  85  3.7   |  26  |  1.18    Ca    8.3<L>      28 Jan 2022 08:21  Phos  3.6     01-28  Mg     1.90     01-28            RADIOLOGY & ADDITIONAL STUDIES:

## 2022-01-28 NOTE — PROGRESS NOTE ADULT - SUBJECTIVE AND OBJECTIVE BOX
***INCOMPLETE***    Neville Ramsey, PGY-1  Internal Medicine  Pager: [phone], [facility]: [phone]    PROGRESS NOTE:     SUBJECTIVE / OVERNIGHT EVENTS: No acute events overnight. ROS negative for fever, chills, cough, SOB, chest pain, nausea, vomiting, diarrhea, constipation, dysuria.    MEDICATIONS  (STANDING):  hydrocortisone hemorrhoidal Suppository 1 Suppository(s) Rectal daily  influenza   Vaccine 0.5 milliLiter(s) IntraMuscular once  pantoprazole    Tablet 40 milliGRAM(s) Oral before breakfast    MEDICATIONS  (PRN):  acetaminophen     Tablet .. 650 milliGRAM(s) Oral every 6 hours PRN Temp greater or equal to 38C (100.4F), Mild Pain (1 - 3), Moderate Pain (4 - 6), Severe Pain (7 - 10)  melatonin 3 milliGRAM(s) Oral at bedtime PRN Sleep      CAPILLARY BLOOD GLUCOSE        I&O's Summary      PHYSICAL EXAM:  Vital Signs Last 24 Hrs  T(C): 36.8 (28 Jan 2022 06:17), Max: 37.3 (27 Jan 2022 15:21)  T(F): 98.3 (28 Jan 2022 06:17), Max: 99.2 (27 Jan 2022 15:21)  HR: 75 (28 Jan 2022 06:17) (75 - 89)  BP: 125/77 (28 Jan 2022 06:17) (109/72 - 180/95)  BP(mean): --  RR: 17 (28 Jan 2022 06:17) (13 - 20)  SpO2: 100% (28 Jan 2022 06:17) (95% - 100%)    CONSTITUTIONAL: NAD, well-developed  RESPIRATORY: Normal respiratory effort; lungs are clear to auscultation bilaterally  CARDIOVASCULAR: Regular rate and rhythm, normal S1 and S2, no murmur/rub/gallop; No lower extremity edema; radial pulses are 2+ bilaterally  ABDOMEN: Nontender to palpation, no rebound/guarding, nondistended, bowel soudns present  MUSCLOSKELETAL: no clubbing or cyanosis of digits; no joint swelling   PSYCH: A+O to person, place, and time; affect appropriate    LABS:                        8.2    6.02  )-----------( 323      ( 27 Jan 2022 06:55 )             24.0     01-27    138  |  104  |  10  ----------------------------<  100<H>  3.7   |  28  |  1.26    Ca    8.4      27 Jan 2022 06:55  Phos  2.9     01-27  Mg     2.00     01-27                   Neville Ramsey, PGY-1  Internal Medicine  Pager: [phone], [facility]: [phone]    PROGRESS NOTE:     SUBJECTIVE / OVERNIGHT EVENTS: No acute events overnight. Eager to have hemorrhoidectomy on Monday; anxious and tearful today about being in the hospital, as he is unable to exercise, his normal outlet to relieve stress. ROS negative for fever, chills, cough, SOB, chest pain, nausea, vomiting, diarrhea, constipation, dysuria.    MEDICATIONS  (STANDING):  hydrocortisone hemorrhoidal Suppository 1 Suppository(s) Rectal daily  influenza   Vaccine 0.5 milliLiter(s) IntraMuscular once  pantoprazole    Tablet 40 milliGRAM(s) Oral before breakfast    MEDICATIONS  (PRN):  acetaminophen     Tablet .. 650 milliGRAM(s) Oral every 6 hours PRN Temp greater or equal to 38C (100.4F), Mild Pain (1 - 3), Moderate Pain (4 - 6), Severe Pain (7 - 10)  melatonin 3 milliGRAM(s) Oral at bedtime PRN Sleep      CAPILLARY BLOOD GLUCOSE        I&O's Summary      PHYSICAL EXAM:  Vital Signs Last 24 Hrs  T(C): 36.8 (28 Jan 2022 06:17), Max: 37.3 (27 Jan 2022 15:21)  T(F): 98.3 (28 Jan 2022 06:17), Max: 99.2 (27 Jan 2022 15:21)  HR: 75 (28 Jan 2022 06:17) (75 - 89)  BP: 125/77 (28 Jan 2022 06:17) (109/72 - 180/95)  BP(mean): --  RR: 17 (28 Jan 2022 06:17) (13 - 20)  SpO2: 100% (28 Jan 2022 06:17) (95% - 100%)    GENERAL: NAD, well-groomed, well-developed  HEAD:  Atraumatic, Normocephalic, +cystic lesion on scalp  EYES: EOMI, PERRLA, conjunctiva and sclera clear  ENMT: Moist mucous membranes  NECK: supple, No JVD  CHEST/LUNG: CTAB; No rales, rhonchi, wheezing, or rubs  HEART: RRR; No murmurs, rubs, or gallops  ABDOMEN: Soft, Nontender, Nondistended; no rebound/guarding  VASCULAR:  2+ Peripheral Pulses, No clubbing, cyanosis, or edema  SKIN: +cystic lesion on scalp  NERVOUS SYSTEM:  Alert & Oriented X3 w/o focal deficits    LABS:                        8.2    6.02  )-----------( 323      ( 27 Jan 2022 06:55 )             24.0     01-27    138  |  104  |  10  ----------------------------<  100<H>  3.7   |  28  |  1.26    Ca    8.4      27 Jan 2022 06:55  Phos  2.9     01-27  Mg     2.00     01-27

## 2022-01-28 NOTE — PROGRESS NOTE ADULT - PROBLEM SELECTOR PLAN 2
Per patient, has hx of internal hemorrhoids. Active bleeding since mid 01/2022. Also with hx of endoscopy showing erosions.  - plan as above Per patient, has hx of internal hemorrhoids. Active bleeding since mid 01/2022. Also with hx of endoscopy showing erosions  - plan as above

## 2022-01-28 NOTE — PROGRESS NOTE ADULT - PROBLEM SELECTOR PLAN 1
2/2 bleeding internal hemorrhoids on colonoscopy; EGD w/one non-bleeding erosion  Flex  Grade 3 inflamed hemorrhoid found on perianal exam  Has received 6 U pRBCs since 1/23, with increase in Hb from 5.8 to 8.0  - trend CBC qd for now, as Hb has been stable  - maintain active T+S, 2 large bore IVs  - c/w Protonix 40mg qd  - appreciate surgery, GI input 2/2 bleeding internal hemorrhoids on colonoscopy; EGD w/one non-bleeding erosion  Flex sig - Grade 3 inflamed hemorrhoid found on perianal exam  Has received 6 U pRBCs since 1/23, with increase in Hb from 5.8 to 8.1  - trend CBC qd for now, as Hb has been stable  - maintain active T+S, 2 large bore IVs  - c/w Protonix 40mg qd  - appreciate surgery, GI input   - plan for hemorrhoidectomy on 1/31/22 w/Dr. Quintero

## 2022-01-28 NOTE — PROGRESS NOTE ADULT - ASSESSMENT
JAVIER RODRIGUEZ is a 41y Male with h/o bleeding gastric ulcers x2 s/p clips (2013), internal hemorrhoids, and recurrent rectal bleeding who initially presented to the ED with complaints of cecilio BRBPR and decreased exercise tolerated c/b weakness and fatigue, GI consulted for rectal bleeding.    #. Rectal bleeding likely 2/2 grade 3 inflamed, prolapsing hemorrhoids noted on perianal exam with flex sig 1/27. May benefit from surgical intervention. We discussed with CRS Dr. Quintero and his team plans to see him today.   #. h/o Bleeding gastric ulcers x2 s/p 2 clips (2013)   - Covid19 negative 1/23                  Recommendations:  - No additional intervention from GI warranted at this time  - Hydrocortisone suppository 25 mg, 1 per rectum once day  - Sitz bath daily  - f/u CRS recs  - Rest of care per primary    Thank you for involving us in this patient's care.    Seen and discussed with Attending, Dr. Jj Pena.    Mona Escobedo MD  Gastroenterology/Hepatology Fellow, PGY-V    NON-URGENT CONSULTS:  Please email [email or web address]  AT NIGHT AND ON WEEKENDS:  Contact on-call GI fellow via answering service ([phone]) from 5pm-8am and on weekends/holidays  MONDAY-FRIDAY 8AM-5PM:  Pager# [phone] ([facility])  GI Phone# [phone] ([facility]) JAVIER RODRIGUEZ is a 41y Male with h/o bleeding gastric ulcers x2 s/p clips (2013), internal hemorrhoids, and recurrent rectal bleeding who initially presented to the ED with complaints of cecilio BRBPR and decreased exercise tolerated c/b weakness and fatigue, GI consulted for rectal bleeding.    #. Rectal bleeding likely 2/2 grade 3 inflamed, prolapsing hemorrhoids noted on perianal exam with flex sig 1/27. May benefit from surgical intervention. We discussed with CRS Dr. Quintero and his team plans to see him today.   #. h/o Bleeding gastric ulcers x2 s/p 2 clips (2013)   - Covid19 negative 1/23                  Recommendations:  - No additional intervention from GI warranted at this time  - Hydrocortisone suppository 25 mg, 1 per rectum once day  - Sitz bath daily  - f/u CRS recs  - Rest of care per primary    Thank you for involving us in this patient's care.    Seen and discussed with Attending, Dr. Jj Pena. Please reach out to GI if any further questions or concerns. Signing off.    Mona Escobedo MD  Gastroenterology/Hepatology Fellow, PGY-V    NON-URGENT CONSULTS:  Please email [email or web address]  [email or web address]  AT NIGHT AND ON WEEKENDS:  Contact on-call GI fellow via answering service ([phone]) from 5pm-8am and on weekends/holidays  MONDAY-FRIDAY 8AM-5PM:  Pager# [phone] ([facility])  GI Phone# [phone] ([facility])

## 2022-01-28 NOTE — PROGRESS NOTE ADULT - SUBJECTIVE AND OBJECTIVE BOX
Chief Complaint:  Patient is a 41y old  Male who presents with a chief complaint of GIB (28 Jan 2022 07:07)         Interval Events:   Denies bleeding overnight  Hgb 8.1 from 8.2    Hospital Medications:  acetaminophen     Tablet .. 650 milliGRAM(s) Oral every 6 hours PRN  hydrocortisone hemorrhoidal Suppository 1 Suppository(s) Rectal daily  influenza   Vaccine 0.5 milliLiter(s) IntraMuscular once  melatonin 3 milliGRAM(s) Oral at bedtime PRN  pantoprazole    Tablet 40 milliGRAM(s) Oral before breakfast  polyethylene glycol 3350 17 Gram(s) Oral daily  senna 2 Tablet(s) Oral at bedtime      ROS:   Complete and normal except as mentioned above.    PHYSICAL EXAM:   Vital Signs:  Vital Signs Last 24 Hrs  T(C): 36.8 (28 Jan 2022 06:17), Max: 37.3 (27 Jan 2022 15:21)  T(F): 98.3 (28 Jan 2022 06:17), Max: 99.2 (27 Jan 2022 15:21)  HR: 75 (28 Jan 2022 06:17) (75 - 89)  BP: 125/77 (28 Jan 2022 06:17) (109/72 - 180/95)  BP(mean): --  RR: 17 (28 Jan 2022 06:17) (13 - 20)  SpO2: 100% (28 Jan 2022 06:17) (95% - 100%)  Daily Height in cm: 180 (27 Jan 2022 14:44)    Daily     GENERAL: no acute distress  NEURO: alert  HEENT: anicteric sclera, no conjunctival pallor appreciated  CHEST: no respiratory distress, no accessory muscle use  CARDIAC: regular rate, rhythm  ABDOMEN: soft, non-tender, non-distended, no rebound or guarding  EXTREMITIES: warm, well perfused, no edema  SKIN: no lesions noted    LABS: reviewed                        8.1    5.97  )-----------( 349      ( 28 Jan 2022 08:21 )             25.7     01-28    138  |  102  |  8   ----------------------------<  85  3.7   |  26  |  1.18    Ca    8.3<L>      28 Jan 2022 08:21  Phos  3.6     01-28  Mg     1.90     01-28          Interval Diagnostic Studies: see sunrise for full report   Chief Complaint:  Patient is a 41y old  Male who presents with a chief complaint of GIB (28 Jan 2022 07:07)    Interval Events:   Denies bleeding overnight  Hgb 8.1 from 8.2    Hospital Medications:  acetaminophen     Tablet .. 650 milliGRAM(s) Oral every 6 hours PRN  hydrocortisone hemorrhoidal Suppository 1 Suppository(s) Rectal daily  influenza   Vaccine 0.5 milliLiter(s) IntraMuscular once  melatonin 3 milliGRAM(s) Oral at bedtime PRN  pantoprazole    Tablet 40 milliGRAM(s) Oral before breakfast  polyethylene glycol 3350 17 Gram(s) Oral daily  senna 2 Tablet(s) Oral at bedtime      ROS:   Complete and normal except as mentioned above.    PHYSICAL EXAM:   Vital Signs:  Vital Signs Last 24 Hrs  T(C): 36.8 (28 Jan 2022 06:17), Max: 37.3 (27 Jan 2022 15:21)  T(F): 98.3 (28 Jan 2022 06:17), Max: 99.2 (27 Jan 2022 15:21)  HR: 75 (28 Jan 2022 06:17) (75 - 89)  BP: 125/77 (28 Jan 2022 06:17) (109/72 - 180/95)  BP(mean): --  RR: 17 (28 Jan 2022 06:17) (13 - 20)  SpO2: 100% (28 Jan 2022 06:17) (95% - 100%)  Daily Height in cm: 180 (27 Jan 2022 14:44)    Daily     GENERAL: no acute distress  NEURO: alert  HEENT: anicteric sclera, no conjunctival pallor appreciated  CHEST: no respiratory distress, no accessory muscle use  CARDIAC: regular rate, rhythm  ABDOMEN: soft, non-tender, non-distended, no rebound or guarding  EXTREMITIES: warm, well perfused, no edema  SKIN: no lesions noted    LABS: reviewed                        8.1    5.97  )-----------( 349      ( 28 Jan 2022 08:21 )             25.7     01-28    138  |  102  |  8   ----------------------------<  85  3.7   |  26  |  1.18    Ca    8.3<L>      28 Jan 2022 08:21  Phos  3.6     01-28  Mg     1.90     01-28          Interval Diagnostic Studies: see sunrise for full report

## 2022-01-28 NOTE — PROGRESS NOTE ADULT - ASSESSMENT
40 yo man w/history of multiple prior lower GI bleeds a/w SOB, lightheadedness, and palpitations iso anemia 2/2 bleeding internal hemorrhoids; bleeding is ongoing.

## 2022-01-29 LAB
ANION GAP SERPL CALC-SCNC: 10 MMOL/L — SIGNIFICANT CHANGE UP (ref 7–14)
BUN SERPL-MCNC: 10 MG/DL — SIGNIFICANT CHANGE UP (ref 7–23)
CALCIUM SERPL-MCNC: 8.5 MG/DL — SIGNIFICANT CHANGE UP (ref 8.4–10.5)
CHLORIDE SERPL-SCNC: 105 MMOL/L — SIGNIFICANT CHANGE UP (ref 98–107)
CO2 SERPL-SCNC: 24 MMOL/L — SIGNIFICANT CHANGE UP (ref 22–31)
CREAT SERPL-MCNC: 1.2 MG/DL — SIGNIFICANT CHANGE UP (ref 0.5–1.3)
GLUCOSE SERPL-MCNC: 89 MG/DL — SIGNIFICANT CHANGE UP (ref 70–99)
HCT VFR BLD CALC: 25.3 % — LOW (ref 39–50)
HGB BLD-MCNC: 8.1 G/DL — LOW (ref 13–17)
MAGNESIUM SERPL-MCNC: 1.9 MG/DL — SIGNIFICANT CHANGE UP (ref 1.6–2.6)
MCHC RBC-ENTMCNC: 26.2 PG — LOW (ref 27–34)
MCHC RBC-ENTMCNC: 32 GM/DL — SIGNIFICANT CHANGE UP (ref 32–36)
MCV RBC AUTO: 81.9 FL — SIGNIFICANT CHANGE UP (ref 80–100)
NRBC # BLD: 0 /100 WBCS — SIGNIFICANT CHANGE UP
NRBC # FLD: 0 K/UL — SIGNIFICANT CHANGE UP
PHOSPHATE SERPL-MCNC: 3.9 MG/DL — SIGNIFICANT CHANGE UP (ref 2.5–4.5)
PLATELET # BLD AUTO: 352 K/UL — SIGNIFICANT CHANGE UP (ref 150–400)
POTASSIUM SERPL-MCNC: 4 MMOL/L — SIGNIFICANT CHANGE UP (ref 3.5–5.3)
POTASSIUM SERPL-SCNC: 4 MMOL/L — SIGNIFICANT CHANGE UP (ref 3.5–5.3)
RBC # BLD: 3.09 M/UL — LOW (ref 4.2–5.8)
RBC # FLD: 15.7 % — HIGH (ref 10.3–14.5)
SARS-COV-2 RNA SPEC QL NAA+PROBE: SIGNIFICANT CHANGE UP
SODIUM SERPL-SCNC: 139 MMOL/L — SIGNIFICANT CHANGE UP (ref 135–145)
WBC # BLD: 6.12 K/UL — SIGNIFICANT CHANGE UP (ref 3.8–10.5)
WBC # FLD AUTO: 6.12 K/UL — SIGNIFICANT CHANGE UP (ref 3.8–10.5)

## 2022-01-29 PROCEDURE — 99232 SBSQ HOSP IP/OBS MODERATE 35: CPT | Mod: GC

## 2022-01-29 RX ADMIN — Medication 650 MILLIGRAM(S): at 09:51

## 2022-01-29 RX ADMIN — PANTOPRAZOLE SODIUM 40 MILLIGRAM(S): 20 TABLET, DELAYED RELEASE ORAL at 06:09

## 2022-01-29 RX ADMIN — Medication 650 MILLIGRAM(S): at 10:51

## 2022-01-29 RX ADMIN — POLYETHYLENE GLYCOL 3350 17 GRAM(S): 17 POWDER, FOR SOLUTION ORAL at 14:27

## 2022-01-29 RX ADMIN — SENNA PLUS 2 TABLET(S): 8.6 TABLET ORAL at 22:21

## 2022-01-29 RX ADMIN — Medication 3 MILLIGRAM(S): at 22:23

## 2022-01-29 RX ADMIN — Medication 1 SUPPOSITORY(S): at 14:27

## 2022-01-29 NOTE — PROGRESS NOTE ADULT - ASSESSMENT
41M presenting with GI bleed and grade 3 internal hemorrhoid.    Plan:  - Appreciate GI recommendations  - OR planning for hemorrhoidectomy on Monday, patient to be preopped and consented    Will order enema AM before surgery    Please ensure patient has active COVID within 72 hours before surgery    AM labs to be ordered morning of 1/31: coags, T&S, CBC, BMP  - Resuscitation per primary team    [facility] Surgery, [id number]

## 2022-01-29 NOTE — PROGRESS NOTE ADULT - PROBLEM SELECTOR PLAN 1
2/2 bleeding internal hemorrhoids on colonoscopy; EGD w/one non-bleeding erosion  Flex sig - Grade 3 inflamed hemorrhoid found on perianal exam  Has received 6 U pRBCs since 1/23, with increase in Hb from 5.8 to 8.1  - trend CBC qd for now, as Hb has been stable  - maintain active T+S, 2 large bore IVs  - c/w Protonix 40mg qd  - appreciate surgery, GI input   - plan for hemorrhoidectomy on 1/31/22 w/Dr. Quintero 2/2 Grade 3 inflamed hemorrhoid   Has received 6 U pRBCs since 1/23, Hb initially 5.8 now stable at ~8.0  - trend CBC qd for now, as Hb has been stable  - maintain active T+S, 2 large bore IVs  - c/w Protonix 40mg qd  - appreciate surgery, GI input   - plan for hemorrhoidectomy on 1/31/22 w/Dr. Quintero

## 2022-01-29 NOTE — PROGRESS NOTE ADULT - SUBJECTIVE AND OBJECTIVE BOX
SURGERY DAILY PROGRESS NOTE:       SUBJECTIVE/ROS: Patient feels well. No acute overnight events. Denies nausea, vomiting, chest pain, shortness of breath. Continues to pass flatus and have BM.    OBJECTIVE:    Vital Signs Last 24 Hrs  T(C): 37.1 (28 Jan 2022 21:48), Max: 37.2 (28 Jan 2022 15:52)  T(F): 98.8 (28 Jan 2022 21:48), Max: 99 (28 Jan 2022 15:52)  HR: 74 (28 Jan 2022 21:48) (74 - 89)  BP: 129/73 (28 Jan 2022 21:48) (129/73 - 145/70)  BP(mean): --  RR: 18 (28 Jan 2022 21:48) (18 - 20)  SpO2: 100% (28 Jan 2022 21:48) (100% - 100%)    I&O's Detail    Physical Exam:  General: NAD, resting comfortably  Pulmonary: normal resp effort  Cardiovascular: NSR, no murmurs  Abdominal: soft, ND/NT  Extremities: WWP, normal strength, no clubbing/cyanosis/edema    LABS:                        8.1    6.12  )-----------( 352      ( 29 Jan 2022 08:15 )             25.3     01-28    138  |  102  |  8   ----------------------------<  85  3.7   |  26  |  1.18    Ca    8.3<L>      28 Jan 2022 08:21  Phos  3.6     01-28  Mg     1.90     01-28

## 2022-01-29 NOTE — PROGRESS NOTE ADULT - PROBLEM SELECTOR PLAN 2
Per patient, has hx of internal hemorrhoids. Active bleeding since mid 01/2022. Also with hx of endoscopy showing erosions  - plan as above Per patient, has hx of internal hemorrhoids. Active bleeding since mid 01/2022  Also with hx of endoscopy showing erosions  - plan as above

## 2022-01-29 NOTE — PROGRESS NOTE ADULT - ASSESSMENT
42 yo man w/history of multiple prior lower GI bleeds a/w SOB, lightheadedness, and palpitations iso anemia 2/2 bleeding internal hemorrhoids; bleeding is ongoing. 40 yo man w/history of multiple prior lower GI bleeds a/w SOB, lightheadedness, and palpitations iso anemia 2/2 bleeding internal hemorrhoids.

## 2022-01-29 NOTE — PROGRESS NOTE ADULT - SUBJECTIVE AND OBJECTIVE BOX
***INCOMPLETE***    Neville Ramsey, PGY-1  Internal Medicine  Pager: [phone], [facility]: [phone]    PROGRESS NOTE:     SUBJECTIVE / OVERNIGHT EVENTS: No acute events overnight. ROS negative for fever, chills, cough, SOB, chest pain, nausea, vomiting, diarrhea, constipation, dysuria.    MEDICATIONS  (STANDING):  hydrocortisone hemorrhoidal Suppository 1 Suppository(s) Rectal daily  influenza   Vaccine 0.5 milliLiter(s) IntraMuscular once  pantoprazole    Tablet 40 milliGRAM(s) Oral before breakfast  polyethylene glycol 3350 17 Gram(s) Oral daily  senna 2 Tablet(s) Oral at bedtime    MEDICATIONS  (PRN):  acetaminophen     Tablet .. 650 milliGRAM(s) Oral every 6 hours PRN Temp greater or equal to 38C (100.4F), Mild Pain (1 - 3), Moderate Pain (4 - 6), Severe Pain (7 - 10)  melatonin 3 milliGRAM(s) Oral at bedtime PRN Sleep      CAPILLARY BLOOD GLUCOSE        I&O's Summary      PHYSICAL EXAM:  Vital Signs Last 24 Hrs  T(C): 37.1 (28 Jan 2022 21:48), Max: 37.2 (28 Jan 2022 15:52)  T(F): 98.8 (28 Jan 2022 21:48), Max: 99 (28 Jan 2022 15:52)  HR: 74 (28 Jan 2022 21:48) (74 - 89)  BP: 129/73 (28 Jan 2022 21:48) (129/73 - 145/70)  BP(mean): --  RR: 18 (28 Jan 2022 21:48) (18 - 20)  SpO2: 100% (28 Jan 2022 21:48) (100% - 100%)    GENERAL: NAD, well-groomed, well-developed  HEAD:  Atraumatic, Normocephalic, +cystic lesion on scalp  EYES: EOMI, PERRLA, conjunctiva and sclera clear  ENMT: Moist mucous membranes  NECK: Supple, No JVD  CHEST/LUNG: CTAB; No rales, rhonchi, wheezing, or rubs  HEART: RRR; No murmurs, rubs, or gallops  ABDOMEN: Soft, Nontender, Nondistended; no rebound/guarding  VASCULAR:  2+ Peripheral Pulses, No clubbing, cyanosis, or edema  SKIN: +cystic lesion on scalp  NERVOUS SYSTEM:  Alert & Oriented X3 w/o focal deficits  LABS:                        8.1    5.97  )-----------( 349      ( 28 Jan 2022 08:21 )             25.7     01-28    138  |  102  |  8   ----------------------------<  85  3.7   |  26  |  1.18    Ca    8.3<L>      28 Jan 2022 08:21  Phos  3.6     01-28  Mg     1.90     01-28                   Neville Ramsey, PGY-1  Internal Medicine  Pager: [phone], [facility]: [phone]    PROGRESS NOTE:     SUBJECTIVE / OVERNIGHT EVENTS: No acute events overnight. Pt feels well today, no acute complaints. ROS negative for fever, chills, cough, SOB, chest pain, nausea, vomiting, diarrhea, constipation, dysuria. No bloody BMs since demonstrating his bleeding hemorrhoids to surgical team yesterday.    MEDICATIONS  (STANDING):  hydrocortisone hemorrhoidal Suppository 1 Suppository(s) Rectal daily  influenza   Vaccine 0.5 milliLiter(s) IntraMuscular once  pantoprazole    Tablet 40 milliGRAM(s) Oral before breakfast  polyethylene glycol 3350 17 Gram(s) Oral daily  senna 2 Tablet(s) Oral at bedtime    MEDICATIONS  (PRN):  acetaminophen     Tablet .. 650 milliGRAM(s) Oral every 6 hours PRN Temp greater or equal to 38C (100.4F), Mild Pain (1 - 3), Moderate Pain (4 - 6), Severe Pain (7 - 10)  melatonin 3 milliGRAM(s) Oral at bedtime PRN Sleep      CAPILLARY BLOOD GLUCOSE        I&O's Summary      PHYSICAL EXAM:  Vital Signs Last 24 Hrs  T(C): 37.1 (28 Jan 2022 21:48), Max: 37.2 (28 Jan 2022 15:52)  T(F): 98.8 (28 Jan 2022 21:48), Max: 99 (28 Jan 2022 15:52)  HR: 74 (28 Jan 2022 21:48) (74 - 89)  BP: 129/73 (28 Jan 2022 21:48) (129/73 - 145/70)  BP(mean): --  RR: 18 (28 Jan 2022 21:48) (18 - 20)  SpO2: 100% (28 Jan 2022 21:48) (100% - 100%)    GENERAL: NAD, well-groomed, well-developed  HEAD:  Atraumatic, Normocephalic, +cystic lesion on scalp  EYES: EOMI, PERRLA, conjunctiva and sclera clear  ENMT: Moist mucous membranes  NECK: Supple, No JVD  CHEST/LUNG: CTAB; No rales, rhonchi, wheezing, or rubs  HEART: RRR; No murmurs, rubs, or gallops  ABDOMEN: Soft, Nontender, Nondistended; no rebound/guarding  VASCULAR:  2+ Peripheral Pulses, No clubbing, cyanosis, or edema  SKIN: +cystic lesion on scalp  NERVOUS SYSTEM:  Alert & Oriented X3 w/o focal deficits  LABS:                        8.1    5.97  )-----------( 349      ( 28 Jan 2022 08:21 )             25.7     01-28    138  |  102  |  8   ----------------------------<  85  3.7   |  26  |  1.18    Ca    8.3<L>      28 Jan 2022 08:21  Phos  3.6     01-28  Mg     1.90     01-28

## 2022-01-30 ENCOUNTER — TRANSCRIPTION ENCOUNTER (OUTPATIENT)
Age: 42
End: 2022-01-30

## 2022-01-30 LAB
ANION GAP SERPL CALC-SCNC: 9 MMOL/L — SIGNIFICANT CHANGE UP (ref 7–14)
BLD GP AB SCN SERPL QL: NEGATIVE — SIGNIFICANT CHANGE UP
BUN SERPL-MCNC: 11 MG/DL — SIGNIFICANT CHANGE UP (ref 7–23)
CALCIUM SERPL-MCNC: 8.6 MG/DL — SIGNIFICANT CHANGE UP (ref 8.4–10.5)
CHLORIDE SERPL-SCNC: 104 MMOL/L — SIGNIFICANT CHANGE UP (ref 98–107)
CO2 SERPL-SCNC: 25 MMOL/L — SIGNIFICANT CHANGE UP (ref 22–31)
CREAT SERPL-MCNC: 1.19 MG/DL — SIGNIFICANT CHANGE UP (ref 0.5–1.3)
GLUCOSE SERPL-MCNC: 85 MG/DL — SIGNIFICANT CHANGE UP (ref 70–99)
HCT VFR BLD CALC: 25.5 % — LOW (ref 39–50)
HGB BLD-MCNC: 8.2 G/DL — LOW (ref 13–17)
MAGNESIUM SERPL-MCNC: 2.1 MG/DL — SIGNIFICANT CHANGE UP (ref 1.6–2.6)
MCHC RBC-ENTMCNC: 26.1 PG — LOW (ref 27–34)
MCHC RBC-ENTMCNC: 32.2 GM/DL — SIGNIFICANT CHANGE UP (ref 32–36)
MCV RBC AUTO: 81.2 FL — SIGNIFICANT CHANGE UP (ref 80–100)
NRBC # BLD: 0 /100 WBCS — SIGNIFICANT CHANGE UP
NRBC # FLD: 0 K/UL — SIGNIFICANT CHANGE UP
PHOSPHATE SERPL-MCNC: 3.9 MG/DL — SIGNIFICANT CHANGE UP (ref 2.5–4.5)
PLATELET # BLD AUTO: 377 K/UL — SIGNIFICANT CHANGE UP (ref 150–400)
POTASSIUM SERPL-MCNC: 3.7 MMOL/L — SIGNIFICANT CHANGE UP (ref 3.5–5.3)
POTASSIUM SERPL-SCNC: 3.7 MMOL/L — SIGNIFICANT CHANGE UP (ref 3.5–5.3)
RBC # BLD: 3.14 M/UL — LOW (ref 4.2–5.8)
RBC # FLD: 15.8 % — HIGH (ref 10.3–14.5)
RH IG SCN BLD-IMP: POSITIVE — SIGNIFICANT CHANGE UP
SODIUM SERPL-SCNC: 138 MMOL/L — SIGNIFICANT CHANGE UP (ref 135–145)
WBC # BLD: 5.93 K/UL — SIGNIFICANT CHANGE UP (ref 3.8–10.5)
WBC # FLD AUTO: 5.93 K/UL — SIGNIFICANT CHANGE UP (ref 3.8–10.5)

## 2022-01-30 PROCEDURE — 99232 SBSQ HOSP IP/OBS MODERATE 35: CPT | Mod: GC

## 2022-01-30 RX ORDER — SODIUM CHLORIDE 9 MG/ML
1000 INJECTION INTRAMUSCULAR; INTRAVENOUS; SUBCUTANEOUS
Refills: 0 | Status: DISCONTINUED | OUTPATIENT
Start: 2022-01-30 | End: 2022-02-01

## 2022-01-30 RX ADMIN — SENNA PLUS 2 TABLET(S): 8.6 TABLET ORAL at 22:04

## 2022-01-30 RX ADMIN — POLYETHYLENE GLYCOL 3350 17 GRAM(S): 17 POWDER, FOR SOLUTION ORAL at 12:01

## 2022-01-30 RX ADMIN — Medication 1 SUPPOSITORY(S): at 12:02

## 2022-01-30 RX ADMIN — Medication 3 MILLIGRAM(S): at 22:04

## 2022-01-30 RX ADMIN — PANTOPRAZOLE SODIUM 40 MILLIGRAM(S): 20 TABLET, DELAYED RELEASE ORAL at 07:08

## 2022-01-30 NOTE — DISCHARGE NOTE PROVIDER - NSDCCPCAREPLAN_GEN_ALL_CORE_FT
PRINCIPAL DISCHARGE DIAGNOSIS  Diagnosis: GI bleed  Assessment and Plan of Treatment: You arrived to the hospital because you were bleeding from what is called an internal hemorrhoid. This has been a recurrent problem for you. You were evaluated by the gastroenterologists, who performed a colonoscopy, which demonstrated your bleeding hemorrhoid and no other abnormality. They also performed an upper endoscopy, which revealed a damaged area in the lining of your stomch (called an erosion), however these were not bleeding and thus not a cause for concern. Surgery evaluated you, and wanted to be certain you were not losing blood from other sources. Thus, you underwent an imaging study called a bleeding scan, which showed no other site of bleeding. The gastroenterology performed a sigmoidoscopy, which again demonstrated inflamed internal hemorrhoids. The surgery team thus decided to remove your internal hemorrhoid on 01/31/2022.

## 2022-01-30 NOTE — DISCHARGE NOTE PROVIDER - NSDCCPTREATMENT_GEN_ALL_CORE_FT
PRINCIPAL PROCEDURE  Procedure: EGD, with colonoscopy  Findings and Treatment: Findings:       Internal hemorrhoids were found during retroflexion and during        endoscopy. The hemorrhoids were large. One appeared friable and eroded.       The exam was otherwise normal throughout the examined colon.                                                                                   Impression:          - Internal hemorrhoids.                       - No specimens collected.  Recommendation:      - Colorectal surgery eval inpatient given significant                        blood loss requiring transfusions.                       - Perform an upper GI endoscopy today.      SECONDARY PROCEDURE  Procedure: EGD, with colonoscopy, with anesthesia  Findings and Treatment: Findings:       The examined esophagus was normal.       The Z-line was regular and was found 42 cm from the incisors.       The entire examined stomach was normal.       One localized erosion without bleeding was found in the duodenal bulb.       The exam of the duodenum was otherwise normal to the 2nd portion.                                                                                   Impression:          - Normal esophagus.                       - Z-line regular, 42 cm from the incisors.                       - Normal stomach.                       - Duodenal erosions without bleeding.                       - No specimens collected.  Recommendation:      - [facility] patient [facility].                       - Resume regular diet.

## 2022-01-30 NOTE — DISCHARGE NOTE PROVIDER - NSDCFUADDAPPT_GEN_ALL_CORE_FT
Please follow up with your primary care doctor within 1-2 weeks of discharge.  Please follow up with your primary care doctor within 1-2 weeks of discharge.     Please call [phone] to set up appointment with primary care doctor at [facility] [place] [facility].

## 2022-01-30 NOTE — DISCHARGE NOTE PROVIDER - PROVIDER TOKENS
FREE:[LAST:[MSGO],FIRST:[clinic],PHONE:[(   )    -],FAX:[(   )    -],ADDRESS:[[address] [place] [address]]],PROVIDER:[TOKEN:[[id number]],FOLLOWUP:[2 weeks]]

## 2022-01-30 NOTE — PROGRESS NOTE ADULT - PROBLEM SELECTOR PLAN 2
Per patient, has hx of internal hemorrhoids. Active bleeding since mid 01/2022  Also with hx of endoscopy showing erosions  - plan as above

## 2022-01-30 NOTE — CHART NOTE - NSCHARTNOTEFT_GEN_A_CORE
41M presenting with GI bleed and grade 3 internal hemorrhoid. OR planning for hemorrhoidectomy on Monday 1/31 with Dr. Quintero.    Plan:  - Consent obtained and placed in chart    Will order enema AM before surgery    COVID negative 1/29    T&S 1/30 & 1/26    4AM labs to be ordered morning of 1/31: coags, CBC, BMP  - Remainder of care per primary team    A Team Surgery, [id number] 41M presenting with GI bleed and grade 3 internal hemorrhoid. OR planning for hemorrhoidectomy on Monday 1/31 with Dr. Quintero.    Plan:  - Consent obtained and placed in chart    Will order enema AM before surgery    COVID negative 1/29    T&S 1/30 & 1/26    4AM labs to be ordered morning of 1/31: coags, CBC, BMP    NPO after midnight  - Remainder of care per primary team    A Team Surgery, [id number] 41M presenting with GI bleed and grade 3 internal hemorrhoid. OR planning for hemorrhoidectomy on Monday 1/31 with Dr. Quintero.    Plan:  - Consent obtained and placed in chart    Fleet saline enema ordered for 10AM on 1/31    COVID negative 1/29    T&S 1/30 & 1/26    2U PRBC on hold for OR    4AM labs to be ordered morning of 1/31: coags, CBC, BMP    NPO after midnight  - Remainder of care per primary team    [facility] Surgery, [id number]

## 2022-01-30 NOTE — PROGRESS NOTE ADULT - PROBLEM SELECTOR PLAN 1
2/2 Grade 3 inflamed hemorrhoid   Has received 6 U pRBCs since 1/23, Hb initially 5.8 now stable at ~8.0  - trend CBC qd for now, as Hb has been stable  - maintain active T+S, 2 large bore IVs  - c/w Protonix 40mg qd  - appreciate surgery, GI input   - plan for hemorrhoidectomy on 1/31/22 w/Dr. Quintero

## 2022-01-30 NOTE — PROGRESS NOTE ADULT - ASSESSMENT
40 yo man w/history of multiple prior lower GI bleeds a/w SOB, lightheadedness, and palpitations iso anemia 2/2 bleeding internal hemorrhoids.

## 2022-01-30 NOTE — DISCHARGE NOTE PROVIDER - NSDCMRMEDTOKEN_GEN_ALL_CORE_FT
polyethylene glycol 3350 oral powder for reconstitution: 17 gram(s) orally once a day   acetaminophen 325 mg oral tablet: 3 tab(s) orally every 8 hours  Please alternate with ibuprofen  ibuprofen 400 mg oral tablet: 1 tab(s) orally every 6 hours  please alternate with Tylenol  polyethylene glycol 3350 oral powder for reconstitution: 17 gram(s) orally once a day   acetaminophen 325 mg oral tablet: 3 tab(s) orally every 8 hours  Please alternate with ibuprofen  ibuprofen 400 mg oral tablet: 1 tab(s) orally every 6 hours  please alternate with Tylenol  oxyCODONE 5 mg oral tablet: 1 tab(s) orally every 12 hours, As needed, Severe Pain (7 - 10) MDD:10  polyethylene glycol 3350 oral powder for reconstitution: 17 gram(s) orally once a day   acetaminophen 325 mg oral tablet: 3 tab(s) orally every 8 hours  Please alternate with ibuprofen  Colace 100 mg oral capsule: 1 cap(s) orally 2 times a day   (hold if having diarrhea)  ibuprofen 400 mg oral tablet: 1 tab(s) orally every 6 hours  please alternate with Tylenol  Metamucil 3.4 g/5.2 g oral powder for reconstitution: 1 gram(s) orally once a day     oxyCODONE 5 mg oral tablet: 1 tab(s) orally every 12 hours, As needed, Severe Pain (7 - 10) MDD:10  polyethylene glycol 3350 oral powder for reconstitution: 17 gram(s) orally once a day  (hold if having diarrhea)  polyethylene glycol 3350 oral powder for reconstitution: 17 gram(s) orally once a day

## 2022-01-30 NOTE — DISCHARGE NOTE PROVIDER - HOSPITAL COURSE
40 y/o man with no medical history who presented to the ED on 1/23/22 w/tachycardia, SOB, and lightheadedness. Over the 2 weeks leading up to admission, he had been  experiencing increasing fatigue, exercise intolerance and lightheadedness. Also noted his HR was in 100s (normally 60s). Also noted significant BRBPR. He has an internal hemorrhoid which he has to manually reduce every time he has a BM. Stools are brown in color and normal in consistency; no straining or pain on defecation. Blood in toilet bowl bright red, no dark blood or clots noted. No blood mixed in stool. Also took Advil consistently every day for the week leading up to admission for headache. He reported he had a colonoscopy in the past for rectal bleeding but says they've been unremarkable. Also had upper endoscopies in past, most recent in May 2021 which showed erosions. In ED, patient was anemic with Hb 5.8 and tachycardic. He received a CTA which was negative for PE. He was admitted for management of GI bleeding. He received 6 U of pRBCs between 1/23-1/26; after this, his Hb stabilized at 8-9. His anemia workup was unremarkable. He underwent colonoscopy which revealed bleeding internal hemorrhoids, and EGD which revealed nonbleeding gastric ulcer. Surgery was consulted for evaluation - did not feel blood loss was from hemorrhoid. Pt then underwent NM bleeding scan, which did not reveal a source of bleeding. GI subsequently performed flexible sigmoidoscopy, which revealed grade 3 inflamed bleeding hemorrhoid. Surgery subsequently agreed to perform hemorrhoidectomy on 01/31/2022. 40 y/o man with no medical history who presented to the ED on 1/23/22 w/tachycardia, SOB, and lightheadedness. Over the 2 weeks leading up to admission, he had been  experiencing increasing fatigue, exercise intolerance and lightheadedness. Also noted his HR was in 100s (normally 60s). Also noted significant BRBPR. He has an internal hemorrhoid which he has to manually reduce every time he has a BM. Stools are brown in color and normal in consistency; no straining or pain on defecation. Blood in toilet bowl bright red, no dark blood or clots noted. No blood mixed in stool. Also took Advil consistently every day for the week leading up to admission for headache. He reported he had a colonoscopy in the past for rectal bleeding but says they've been unremarkable. Also had upper endoscopies in past, most recent in May 2021 which showed erosions. In ED, patient was anemic with Hb 5.8 and tachycardic. He received a CTA which was negative for PE. He was admitted for management of GI bleeding. He received 6 U of pRBCs between 1/23-1/26; after this, his Hb stabilized at 8-9. His anemia workup was unremarkable. He underwent colonoscopy which revealed bleeding internal hemorrhoids, and EGD which revealed nonbleeding gastric ulcer. Surgery was consulted for evaluation - did not feel blood loss was from hemorrhoid. Pt then underwent NM bleeding scan, which did not reveal a source of bleeding. GI subsequently performed flexible sigmoidoscopy, which revealed grade 3 inflamed bleeding hemorrhoid. Surgery subsequently agreed to perform hemorrhoidectomy on 01/31/2022. Patient tolerated procedure well, will follow up with Dr. Quintero from colorectal surgery in two weeks.

## 2022-01-30 NOTE — PROGRESS NOTE ADULT - SUBJECTIVE AND OBJECTIVE BOX
PROGRESS NOTE:   Authored by Keyshawn Jara MD  PGY-2, Internal Medicine  Pager [facility] [phone], J [phone]     Patient is a 41y old  Male who presents with a chief complaint of GIB (29 Jan 2022 09:30)      SUBJECTIVE / OVERNIGHT EVENTS: No events overnight.    ADDITIONAL REVIEW OF SYSTEMS: Denies fevers, chills, n/v.    MEDICATIONS  (STANDING):  hydrocortisone hemorrhoidal Suppository 1 Suppository(s) Rectal daily  influenza   Vaccine 0.5 milliLiter(s) IntraMuscular once  pantoprazole    Tablet 40 milliGRAM(s) Oral before breakfast  polyethylene glycol 3350 17 Gram(s) Oral daily  senna 2 Tablet(s) Oral at bedtime    MEDICATIONS  (PRN):  acetaminophen     Tablet .. 650 milliGRAM(s) Oral every 6 hours PRN Temp greater or equal to 38C (100.4F), Mild Pain (1 - 3), Moderate Pain (4 - 6), Severe Pain (7 - 10)  melatonin 3 milliGRAM(s) Oral at bedtime PRN Sleep      CAPILLARY BLOOD GLUCOSE        I&O's Summary      PHYSICAL EXAM:  Vital Signs Last 24 Hrs  T(C): 36.8 (30 Jan 2022 06:51), Max: 36.9 (29 Jan 2022 13:14)  T(F): 98.2 (30 Jan 2022 06:51), Max: 98.4 (29 Jan 2022 13:14)  HR: 70 (30 Jan 2022 06:51) (65 - 71)  BP: 133/84 (30 Jan 2022 06:51) (129/66 - 147/72)  BP(mean): --  RR: 18 (30 Jan 2022 06:51) (18 - 18)  SpO2: 100% (30 Jan 2022 06:51) (100% - 100%)    CONSTITUTIONAL: NAD, lying in bed comfortably  RESPIRATORY: Normal respiratory effort; CTABL  CARDIOVASCULAR: Regular rate and rhythm, normal S1 and S2, no murmur/rub/gallop  ABDOMEN: Soft, nondistended, nontender to palpation, normoactive bowel sounds, no rebound/guarding  MUSCLOSKELETAL: no joint swelling or tenderness to palpation, FROM all extremities  NEURO: AAOx3 to person, place, and time, full and equal strength all extremities   EXTREMITIES: no pedal edema    LABS:                        8.1    6.12  )-----------( 352      ( 29 Jan 2022 08:15 )             25.3     01-29    139  |  105  |  10  ----------------------------<  89  4.0   |  24  |  1.20    Ca    8.5      29 Jan 2022 08:15  Phos  3.9     01-29  Mg     1.90     01-29                  RADIOLOGY & ADDITIONAL TESTS:

## 2022-01-30 NOTE — DISCHARGE NOTE PROVIDER - CARE PROVIDER_API CALL
MS, [facility]  [address] [place] [address]  Phone: (   )    -  Fax: (   )    -  Follow Up Time:     Shantanu Quintero (MD)  ColonRectal Surgery; Surgery  [facility] [address]  Phone: [phone]  Fax: [phone]  Follow Up Time: 2 weeks

## 2022-01-31 ENCOUNTER — APPOINTMENT (OUTPATIENT)
Dept: COLORECTAL SURGERY | Facility: HOSPITAL | Age: 42
End: 2022-01-31
Payer: COMMERCIAL

## 2022-01-31 ENCOUNTER — RESULT REVIEW (OUTPATIENT)
Age: 42
End: 2022-01-31

## 2022-01-31 ENCOUNTER — TRANSCRIPTION ENCOUNTER (OUTPATIENT)
Age: 42
End: 2022-01-31

## 2022-01-31 LAB
ANION GAP SERPL CALC-SCNC: 8 MMOL/L — SIGNIFICANT CHANGE UP (ref 7–14)
APTT BLD: 31.2 SEC — SIGNIFICANT CHANGE UP (ref 27–36.3)
BUN SERPL-MCNC: 10 MG/DL — SIGNIFICANT CHANGE UP (ref 7–23)
CALCIUM SERPL-MCNC: 8.6 MG/DL — SIGNIFICANT CHANGE UP (ref 8.4–10.5)
CHLORIDE SERPL-SCNC: 103 MMOL/L — SIGNIFICANT CHANGE UP (ref 98–107)
CO2 SERPL-SCNC: 25 MMOL/L — SIGNIFICANT CHANGE UP (ref 22–31)
CREAT SERPL-MCNC: 1.17 MG/DL — SIGNIFICANT CHANGE UP (ref 0.5–1.3)
GLUCOSE SERPL-MCNC: 97 MG/DL — SIGNIFICANT CHANGE UP (ref 70–99)
HCT VFR BLD CALC: 26 % — LOW (ref 39–50)
HGB BLD-MCNC: 8.1 G/DL — LOW (ref 13–17)
INR BLD: 1.11 RATIO — SIGNIFICANT CHANGE UP (ref 0.88–1.16)
MAGNESIUM SERPL-MCNC: 2 MG/DL — SIGNIFICANT CHANGE UP (ref 1.6–2.6)
MCHC RBC-ENTMCNC: 26 PG — LOW (ref 27–34)
MCHC RBC-ENTMCNC: 31.2 GM/DL — LOW (ref 32–36)
MCV RBC AUTO: 83.3 FL — SIGNIFICANT CHANGE UP (ref 80–100)
NRBC # BLD: 0 /100 WBCS — SIGNIFICANT CHANGE UP
NRBC # FLD: 0 K/UL — SIGNIFICANT CHANGE UP
PHOSPHATE SERPL-MCNC: 3.8 MG/DL — SIGNIFICANT CHANGE UP (ref 2.5–4.5)
PLATELET # BLD AUTO: 377 K/UL — SIGNIFICANT CHANGE UP (ref 150–400)
POTASSIUM SERPL-MCNC: 4 MMOL/L — SIGNIFICANT CHANGE UP (ref 3.5–5.3)
POTASSIUM SERPL-SCNC: 4 MMOL/L — SIGNIFICANT CHANGE UP (ref 3.5–5.3)
PROTHROM AB SERPL-ACNC: 12.6 SEC — SIGNIFICANT CHANGE UP (ref 10.6–13.6)
RBC # BLD: 3.12 M/UL — LOW (ref 4.2–5.8)
RBC # FLD: 15.8 % — HIGH (ref 10.3–14.5)
SODIUM SERPL-SCNC: 136 MMOL/L — SIGNIFICANT CHANGE UP (ref 135–145)
WBC # BLD: 6.44 K/UL — SIGNIFICANT CHANGE UP (ref 3.8–10.5)
WBC # FLD AUTO: 6.44 K/UL — SIGNIFICANT CHANGE UP (ref 3.8–10.5)

## 2022-01-31 PROCEDURE — 88304 TISSUE EXAM BY PATHOLOGIST: CPT | Mod: 26

## 2022-01-31 PROCEDURE — 46260 REMOVE IN/EX HEM GROUPS 2+: CPT

## 2022-01-31 PROCEDURE — 45505 REPAIR OF RECTUM: CPT | Mod: 59

## 2022-01-31 PROCEDURE — 99233 SBSQ HOSP IP/OBS HIGH 50: CPT

## 2022-01-31 RX ORDER — POLYETHYLENE GLYCOL 3350 17 G/17G
17 POWDER, FOR SOLUTION ORAL DAILY
Refills: 0 | Status: DISCONTINUED | OUTPATIENT
Start: 2022-01-31 | End: 2022-02-01

## 2022-01-31 RX ORDER — PSYLLIUM SEED (WITH DEXTROSE)
1 POWDER (GRAM) ORAL DAILY
Refills: 0 | Status: DISCONTINUED | OUTPATIENT
Start: 2022-01-31 | End: 2022-02-01

## 2022-01-31 RX ORDER — IBUPROFEN 200 MG
400 TABLET ORAL EVERY 6 HOURS
Refills: 0 | Status: DISCONTINUED | OUTPATIENT
Start: 2022-01-31 | End: 2022-02-01

## 2022-01-31 RX ORDER — ACETAMINOPHEN 500 MG
500 TABLET ORAL EVERY 6 HOURS
Refills: 0 | Status: DISCONTINUED | OUTPATIENT
Start: 2022-01-31 | End: 2022-02-01

## 2022-01-31 RX ORDER — POLYETHYLENE GLYCOL 3350 17 G/17G
17 POWDER, FOR SOLUTION ORAL
Qty: 0 | Refills: 0 | DISCHARGE
Start: 2022-01-31

## 2022-01-31 RX ORDER — OXYCODONE HYDROCHLORIDE 5 MG/1
5 TABLET ORAL EVERY 12 HOURS
Refills: 0 | Status: DISCONTINUED | OUTPATIENT
Start: 2022-01-31 | End: 2022-02-01

## 2022-01-31 RX ADMIN — POLYETHYLENE GLYCOL 3350 17 GRAM(S): 17 POWDER, FOR SOLUTION ORAL at 17:19

## 2022-01-31 RX ADMIN — SODIUM CHLORIDE 100 MILLILITER(S): 9 INJECTION INTRAMUSCULAR; INTRAVENOUS; SUBCUTANEOUS at 00:37

## 2022-01-31 RX ADMIN — Medication 650 MILLIGRAM(S): at 05:00

## 2022-01-31 RX ADMIN — OXYCODONE HYDROCHLORIDE 5 MILLIGRAM(S): 5 TABLET ORAL at 22:31

## 2022-01-31 RX ADMIN — Medication 1 ENEMA: at 10:45

## 2022-01-31 RX ADMIN — Medication 1 PACKET(S): at 18:13

## 2022-01-31 RX ADMIN — OXYCODONE HYDROCHLORIDE 5 MILLIGRAM(S): 5 TABLET ORAL at 21:26

## 2022-01-31 RX ADMIN — Medication 500 MILLIGRAM(S): at 18:26

## 2022-01-31 RX ADMIN — Medication 500 MILLIGRAM(S): at 17:22

## 2022-01-31 RX ADMIN — Medication 400 MILLIGRAM(S): at 17:23

## 2022-01-31 RX ADMIN — PANTOPRAZOLE SODIUM 40 MILLIGRAM(S): 20 TABLET, DELAYED RELEASE ORAL at 04:20

## 2022-01-31 RX ADMIN — Medication 650 MILLIGRAM(S): at 04:20

## 2022-01-31 NOTE — PROGRESS NOTE ADULT - SUBJECTIVE AND OBJECTIVE BOX
PROGRESS NOTE:   Bob Shelley, PGY-1  On TEAMS  NS: [phone], J: [id number]    Patient is a 41y old  Male who presents with a chief complaint of GIB (29 Jan 2022 09:30)      SUBJECTIVE / OVERNIGHT EVENTS:    ADDITIONAL REVIEW OF SYSTEMS: Negative except as noted above    MEDICATIONS  (STANDING):  hydrocortisone hemorrhoidal Suppository 1 Suppository(s) Rectal daily  influenza   Vaccine 0.5 milliLiter(s) IntraMuscular once  pantoprazole    Tablet 40 milliGRAM(s) Oral before breakfast  polyethylene glycol 3350 17 Gram(s) Oral daily  senna 2 Tablet(s) Oral at bedtime    MEDICATIONS  (PRN):  acetaminophen     Tablet .. 650 milliGRAM(s) Oral every 6 hours PRN Temp greater or equal to 38C (100.4F), Mild Pain (1 - 3), Moderate Pain (4 - 6), Severe Pain (7 - 10)  melatonin 3 milliGRAM(s) Oral at bedtime PRN Sleep      CAPILLARY BLOOD GLUCOSE        I&O's Summary      PHYSICAL EXAM:  Vital Signs Last 24 Hrs  T(C): 36.8 (30 Jan 2022 06:51), Max: 36.9 (29 Jan 2022 13:14)  T(F): 98.2 (30 Jan 2022 06:51), Max: 98.4 (29 Jan 2022 13:14)  HR: 70 (30 Jan 2022 06:51) (65 - 71)  BP: 133/84 (30 Jan 2022 06:51) (129/66 - 147/72)  BP(mean): --  RR: 18 (30 Jan 2022 06:51) (18 - 18)  SpO2: 100% (30 Jan 2022 06:51) (100% - 100%)    CONSTITUTIONAL: NAD, lying in bed comfortably  RESPIRATORY: Normal respiratory effort; CTABL  CARDIOVASCULAR: Regular rate and rhythm, normal S1 and S2, no murmur/rub/gallop  ABDOMEN: Soft, nondistended, nontender to palpation, normoactive bowel sounds, no rebound/guarding  MUSCLOSKELETAL: no joint swelling or tenderness to palpation, FROM all extremities  NEURO: AAOx3 to person, place, and time, full and equal strength all extremities   EXTREMITIES: no pedal edema    LABS:                        8.1    6.12  )-----------( 352      ( 29 Jan 2022 08:15 )             25.3     01-29    139  |  105  |  10  ----------------------------<  89  4.0   |  24  |  1.20    Ca    8.5      29 Jan 2022 08:15  Phos  3.9     01-29  Mg     1.90     01-29                  RADIOLOGY & ADDITIONAL TESTS:     PROGRESS NOTE:   Bob Shelley, PGY-1  On TEAMS  NS: [phone], [facility]: [id number]    Patient is a 41y old  Male who presents with a chief complaint of GIB (29 Jan 2022 09:30)      SUBJECTIVE / OVERNIGHT EVENTS: No acute events overnight. The patient feels well and has no acute complaints. No episodes of bleeding overnight. Patient reports last he noticed rectal bleeding with Saturday. Plan for hemorrhoidectomy today with colorectal surgery. No other complaints at this time.     ADDITIONAL REVIEW OF SYSTEMS: Negative except as noted above    MEDICATIONS  (STANDING):  hydrocortisone hemorrhoidal Suppository 1 Suppository(s) Rectal daily  influenza   Vaccine 0.5 milliLiter(s) IntraMuscular once  pantoprazole    Tablet 40 milliGRAM(s) Oral before breakfast  polyethylene glycol 3350 17 Gram(s) Oral daily  senna 2 Tablet(s) Oral at bedtime    MEDICATIONS  (PRN):  acetaminophen     Tablet .. 650 milliGRAM(s) Oral every 6 hours PRN Temp greater or equal to 38C (100.4F), Mild Pain (1 - 3), Moderate Pain (4 - 6), Severe Pain (7 - 10)  melatonin 3 milliGRAM(s) Oral at bedtime PRN Sleep      CAPILLARY BLOOD GLUCOSE        I&O's Summary      PHYSICAL EXAM:  Vital Signs Last 24 Hrs  T(C): 36.8 (30 Jan 2022 06:51), Max: 36.9 (29 Jan 2022 13:14)  T(F): 98.2 (30 Jan 2022 06:51), Max: 98.4 (29 Jan 2022 13:14)  HR: 70 (30 Jan 2022 06:51) (65 - 71)  BP: 133/84 (30 Jan 2022 06:51) (129/66 - 147/72)  BP(mean): --  RR: 18 (30 Jan 2022 06:51) (18 - 18)  SpO2: 100% (30 Jan 2022 06:51) (100% - 100%)    CONSTITUTIONAL: NAD, lying in bed comfortably  RESPIRATORY: Normal respiratory effort; CTABL  CARDIOVASCULAR: Regular rate and rhythm, normal S1 and S2, no murmur/rub/gallop  ABDOMEN: Soft, nondistended, nontender to palpation, normoactive bowel sounds, no rebound/guarding  MUSCLOSKELETAL: no joint swelling or tenderness to palpation, FROM all extremities  NEURO: AAOx3 to person, place, and time, full and equal strength all extremities   EXTREMITIES: no pedal edema    LABS:                        8.1    6.12  )-----------( 352      ( 29 Jan 2022 08:15 )             25.3     01-29    139  |  105  |  10  ----------------------------<  89  4.0   |  24  |  1.20    Ca    8.5      29 Jan 2022 08:15  Phos  3.9     01-29  Mg     1.90     01-29                  RADIOLOGY & ADDITIONAL TESTS:

## 2022-01-31 NOTE — PROGRESS NOTE ADULT - ASSESSMENT
42 yo man w/history of multiple prior lower GI bleeds a/w SOB, lightheadedness, and palpitations iso anemia 2/2 bleeding internal hemorrhoids.

## 2022-01-31 NOTE — BRIEF OPERATIVE NOTE - OPERATION/FINDINGS
3 column hemorrhoids, Right posterior largest and with friable tissue, resected  Right anterior hemorrhoid cauterized  Left hemorrhoidopexy

## 2022-01-31 NOTE — PROGRESS NOTE ADULT - SUBJECTIVE AND OBJECTIVE BOX
SURGERY DAILY PROGRESS NOTE:     SUBJECTIVE/ROS: Pt seen and examined on AM rounds. No acute events overnight. Denies any continues bleeding.       OBJECTIVE:  Vital Signs Last 24 Hrs  T(C): 36.9 (01-31-22 @ 04:21), Max: 37.1 (01-30-22 @ 20:47)  HR: 74 (01-31-22 @ 04:21) (72 - 90)  BP: 135/83 (01-31-22 @ 04:21) (127/73 - 136/92)  ABP: --  ABP(mean): --  RR: 17 (01-31-22 @ 04:21) (17 - 17)  SpO2: 100% (01-31-22 @ 04:21) (100% - 100%)  Wt(kg): --  CVP(mm Hg): --      Physical Exam:  General: NAD, resting comfortably  Pulmonary: normal resp effort  Cardiovascular: NSR, no murmurs  Abdominal: soft, ND/NT  Extremities: WWP, normal strength      LABS:  CBC (01-31 @ 04:43)                              8.1<L>                         6.44    )----------------(  377        --    % Neutrophils, --    % Lymphocytes, ANC: --                                  26.0<L>  CBC (01-30 @ 07:33)                              8.2<L>                         5.93    )----------------(  377        --    % Neutrophils, --    % Lymphocytes, ANC: --                                  25.5<L>    BMP (01-31 @ 04:43)             136     |  103     |  10    		Ca++ --      Ca 8.6                ---------------------------------( 97    		Mg 2.00               4.0     |  25      |  1.17  			Ph 3.8     BMP (01-30 @ 07:33)             138     |  104     |  11    		Ca++ --      Ca 8.6                ---------------------------------( 85    		Mg 2.10               3.7     |  25      |  1.19  			Ph 3.9         Coags (01-31 @ 04:43)  aPTT 31.2 / INR 1.11 / PT 12.6

## 2022-01-31 NOTE — PROGRESS NOTE ADULT - ASSESSMENT
ASSESSMENT: 41M presenting with GI bleed and grade 3 internal hemorrhoid.    Plan:  - OR TODAY for hemorrhoidectomy   - Enema ordered for 10:00 AM  - COVID neg 1/29  - Resuscitation per primary team    A Team Surgery  [id number]

## 2022-01-31 NOTE — PROGRESS NOTE ADULT - PROBLEM SELECTOR PLAN 1
2/2 Grade 3 inflamed hemorrhoid   Has received 6 U pRBCs since 1/23, Hb initially 5.8 now stable at ~8.0  - trend CBC qd for now, as Hb has been stable  - maintain active T+S, 2 large bore IVs  - c/w Protonix 40mg qd  - appreciate surgery, GI input   - plan for hemorrhoidectomy on 1/31/22 w/Dr. Quintero In the setting of GI bleed. Active bleeding since mid 01/2022 per patient. 2/2 Grade 3 inflamed hemorrhoid. Also with erosions on endoscopy.    Has received 6 U pRBCs since 1/23, Hb initially 5.8 now stable at ~8.0  - trend CBC qd for now, as Hb has been stable  - maintain active T+S, 2 large bore IVs  - c/w Protonix 40mg qd  - appreciate surgery, GI input   - hemorrhoidectomy today w/Dr. Quintero

## 2022-01-31 NOTE — PROGRESS NOTE ADULT - PROBLEM SELECTOR PLAN 2
Per patient, has hx of internal hemorrhoids. Active bleeding since mid 01/2022  Also with hx of endoscopy showing erosions  - plan as above DVT ppx: no pharmacologic DVT ppx, low risk patient who is actively bleeding  Diet: regular  Code: Full  Dispo: likely home

## 2022-01-31 NOTE — CHART NOTE - NSCHARTNOTEFT_GEN_A_CORE
GENERAL SURG POSTOP CHECK    SUBJECTIVE: Pain controlled, no new complaints. Passing flatus, denies any blood per rectum.    VITALS  T(C): 36.9 (01-31-22 @ 21:15), Max: 36.9 (01-31-22 @ 04:21)  HR: 79 (01-31-22 @ 21:15) (74 - 104)  BP: 130/69 (01-31-22 @ 21:15) (130/69 - 164/66)  RR: 18 (01-31-22 @ 21:15) (12 - 19)  SpO2: 100% (01-31-22 @ 21:15) (99% - 100%)    PHYSICAL EXAM  General: lying in bed, NAD  Resp: no increased WOB  Abd: soft, non-distended, non-tender      LABS                        8.1    6.44  )-----------( 377      ( 31 Jan 2022 04:43 )             26.0     01-31    136  |  103  |  10  ----------------------------<  97  4.0   |  25  |  1.17    Ca    8.6      31 Jan 2022 04:43  Phos  3.8     01-31  Mg     2.00     01-31      ASSESSMENT & PLAN  41yMale s/p hemorrhoidectomy. Recovering appropriately.  -stable for d/c from surgical standpoint  -please follow post-op/discharge instructions (see chart note)  -remainder of care per primary team

## 2022-01-31 NOTE — CHART NOTE - NSCHARTNOTEFT_GEN_A_CORE
For pain control post-op and upon discharge home:    Sitz baths prn  Metamucil. 1 tsp in tall glass of H20 daily  Colace 100mg BID  Miralax 1 cap daily  Motrin 200-400mg Q6 hours standing  Tylenol 500mg q6 hours standing  Oxycodone 5mg Q12 hours prn breakthrough pain only  High fiber diet  Follow-up with Dr. Quintero in 2 weeks.    Shekhar Owens, PGY5  [id number] For pain control post-op and upon discharge home:    Sitz baths prn  Metamucil. 1 tsp in tall glass of H20 daily  Colace 100mg BID  Miralax 1 cap daily  Motrin 200-400mg Q6 hours standing  Tylenol 500mg q6 hours standing  Oxycodone 5mg Q12 hours prn breakthrough pain only  High fiber diet  Rectal packing will fall out with first BM. No need to replace.  Follow-up with Dr. Quintero in 2 weeks.    Shekhar Owens, PGY5  [id number]

## 2022-01-31 NOTE — BRIEF OPERATIVE NOTE - NSICDXBRIEFPROCEDURE_GEN_ALL_CORE_FT
PROCEDURES:  Complex hemorrhoidectomy of internal and external hemorrhoids 31-Jan-2022 13:46:56  Shekhar Owens

## 2022-01-31 NOTE — DISCHARGE NOTE NURSING/CASE MANAGEMENT/SOCIAL WORK - PATIENT PORTAL LINK FT
You can access the FollowMyHealth Patient Portal offered by [facility] by registering at the following website: [email or web address]. By joining [facility]’s FollowMyHealth portal, you will also be able to view your health information using other applications (apps) compatible with our system.

## 2022-02-01 VITALS
SYSTOLIC BLOOD PRESSURE: 138 MMHG | RESPIRATION RATE: 18 BRPM | OXYGEN SATURATION: 100 % | DIASTOLIC BLOOD PRESSURE: 76 MMHG | HEART RATE: 76 BPM | TEMPERATURE: 98 F

## 2022-02-01 PROBLEM — Z00.00 ENCOUNTER FOR PREVENTIVE HEALTH EXAMINATION: Noted: 2022-02-01

## 2022-02-01 LAB
ANION GAP SERPL CALC-SCNC: 10 MMOL/L — SIGNIFICANT CHANGE UP (ref 7–14)
BUN SERPL-MCNC: 11 MG/DL — SIGNIFICANT CHANGE UP (ref 7–23)
CALCIUM SERPL-MCNC: 8.3 MG/DL — LOW (ref 8.4–10.5)
CHLORIDE SERPL-SCNC: 101 MMOL/L — SIGNIFICANT CHANGE UP (ref 98–107)
CO2 SERPL-SCNC: 23 MMOL/L — SIGNIFICANT CHANGE UP (ref 22–31)
CREAT SERPL-MCNC: 1.14 MG/DL — SIGNIFICANT CHANGE UP (ref 0.5–1.3)
GLUCOSE SERPL-MCNC: 106 MG/DL — HIGH (ref 70–99)
HCT VFR BLD CALC: 23.5 % — LOW (ref 39–50)
HGB BLD-MCNC: 7.6 G/DL — LOW (ref 13–17)
MAGNESIUM SERPL-MCNC: 1.9 MG/DL — SIGNIFICANT CHANGE UP (ref 1.6–2.6)
MCHC RBC-ENTMCNC: 26.3 PG — LOW (ref 27–34)
MCHC RBC-ENTMCNC: 32.3 GM/DL — SIGNIFICANT CHANGE UP (ref 32–36)
MCV RBC AUTO: 81.3 FL — SIGNIFICANT CHANGE UP (ref 80–100)
NRBC # BLD: 0 /100 WBCS — SIGNIFICANT CHANGE UP
NRBC # FLD: 0 K/UL — SIGNIFICANT CHANGE UP
PHOSPHATE SERPL-MCNC: 3 MG/DL — SIGNIFICANT CHANGE UP (ref 2.5–4.5)
PLATELET # BLD AUTO: 381 K/UL — SIGNIFICANT CHANGE UP (ref 150–400)
POTASSIUM SERPL-MCNC: 3.6 MMOL/L — SIGNIFICANT CHANGE UP (ref 3.5–5.3)
POTASSIUM SERPL-SCNC: 3.6 MMOL/L — SIGNIFICANT CHANGE UP (ref 3.5–5.3)
RBC # BLD: 2.89 M/UL — LOW (ref 4.2–5.8)
RBC # FLD: 16.1 % — HIGH (ref 10.3–14.5)
SODIUM SERPL-SCNC: 134 MMOL/L — LOW (ref 135–145)
WBC # BLD: 10.76 K/UL — HIGH (ref 3.8–10.5)
WBC # FLD AUTO: 10.76 K/UL — HIGH (ref 3.8–10.5)

## 2022-02-01 PROCEDURE — 99239 HOSP IP/OBS DSCHRG MGMT >30: CPT

## 2022-02-01 RX ORDER — OXYCODONE HYDROCHLORIDE 5 MG/1
1 TABLET ORAL
Qty: 8 | Refills: 0
Start: 2022-02-01 | End: 2022-02-04

## 2022-02-01 RX ORDER — DOCUSATE SODIUM 100 MG
1 CAPSULE ORAL
Qty: 28 | Refills: 0
Start: 2022-02-01 | End: 2022-02-14

## 2022-02-01 RX ORDER — ACETAMINOPHEN 500 MG
3 TABLET ORAL
Qty: 126 | Refills: 0
Start: 2022-02-01 | End: 2022-02-14

## 2022-02-01 RX ORDER — POLYETHYLENE GLYCOL 3350 17 G/17G
17 POWDER, FOR SOLUTION ORAL
Qty: 238 | Refills: 0
Start: 2022-02-01 | End: 2022-02-14

## 2022-02-01 RX ORDER — ACETAMINOPHEN 500 MG
975 TABLET ORAL EVERY 8 HOURS
Refills: 0 | Status: DISCONTINUED | OUTPATIENT
Start: 2022-02-01 | End: 2022-02-01

## 2022-02-01 RX ORDER — PSYLLIUM SEED (WITH DEXTROSE)
1 POWDER (GRAM) ORAL
Qty: 14 | Refills: 0
Start: 2022-02-01 | End: 2022-02-14

## 2022-02-01 RX ORDER — IBUPROFEN 200 MG
1 TABLET ORAL
Qty: 56 | Refills: 0
Start: 2022-02-01 | End: 2022-02-14

## 2022-02-01 RX ADMIN — OXYCODONE HYDROCHLORIDE 5 MILLIGRAM(S): 5 TABLET ORAL at 15:21

## 2022-02-01 RX ADMIN — Medication 1 PACKET(S): at 11:14

## 2022-02-01 RX ADMIN — POLYETHYLENE GLYCOL 3350 17 GRAM(S): 17 POWDER, FOR SOLUTION ORAL at 11:07

## 2022-02-01 RX ADMIN — PANTOPRAZOLE SODIUM 40 MILLIGRAM(S): 20 TABLET, DELAYED RELEASE ORAL at 06:09

## 2022-02-01 RX ADMIN — Medication 500 MILLIGRAM(S): at 06:09

## 2022-02-01 RX ADMIN — Medication 400 MILLIGRAM(S): at 06:09

## 2022-02-01 RX ADMIN — Medication 500 MILLIGRAM(S): at 00:56

## 2022-02-01 RX ADMIN — Medication 500 MILLIGRAM(S): at 11:07

## 2022-02-01 RX ADMIN — Medication 400 MILLIGRAM(S): at 00:56

## 2022-02-01 RX ADMIN — Medication 400 MILLIGRAM(S): at 11:07

## 2022-02-01 RX ADMIN — OXYCODONE HYDROCHLORIDE 5 MILLIGRAM(S): 5 TABLET ORAL at 14:33

## 2022-02-01 NOTE — DIETITIAN INITIAL EVALUATION ADULT. - PERTINENT MEDS FT
MEDICATIONS  (STANDING):  acetaminophen     Tablet .. 500 milliGRAM(s) Oral every 6 hours  ibuprofen  Tablet. 400 milliGRAM(s) Oral every 6 hours  influenza   Vaccine 0.5 milliLiter(s) IntraMuscular once  pantoprazole    Tablet 40 milliGRAM(s) Oral before breakfast  polyethylene glycol 3350 17 Gram(s) Oral daily  psyllium Powder 1 Packet(s) Oral daily  sodium chloride 0.9%. 1000 milliLiter(s) (100 mL/Hr) IV Continuous <Continuous>

## 2022-02-01 NOTE — PROGRESS NOTE ADULT - ATTENDING COMMENTS
Bleeding internal hemorrhoids w/ anemia  -Pt elicited active bleeding w/ straining on the commode  -I recommended pt undergo formal hemorrhoidectomy  -Risks and benefits were reviewed at length including bleeding and post procedure pain  -OR scheduled for 1/31/22
discharge planning and coordination ~ 45mins.
As above    41 Male h/o recurrent GI bleeds, status post EGDs and colonoscopies with gastric ulcer, hemorrhoids p/w hematochezia after passing brown stool.  Status post EGD and colonoscopy only significant for hemorrhoids.    Recs:  - Monitor CBC, transfuse to Hb >7  - Awaiting hemorrhoidectomy with CRS
As above    41 Male h/o recurrent GI bleeds, status post EGDs and colonoscopies with gastric ulcer, hemorrhoids p/w hematochezia after passing brown stool.  Status post EGD and colonoscopy only significant for hemorrhoids.    Recs:  - Monitor CBC, transfuse to Hb >7  - Check NM Bleeding scan  - If rebleeds, will perform flex sig to evaluate hemorrhoids at time of bleeding
40 yo man w/history of multiple prior lower GI bleeds a/w SOB, lightheadedness, and palpitations iso anemia 2/2 bleeding internal hemorrhoids.  pending hemorrhoid surgery tomorrow .
41M with PMH of gastric ulcers, internal hemorrhoids who presents to ED with palpitations and dyspnea x 1 week in the setting of symptomatic anemia due to GIB.     Hgb slowly rising. No rectal bleeding with BM this morning per patient. NM Bleeding Scan reviewed; no e/o hemorrhage. GI to perform sigmoidoscopy today. Awaiting further recommendations, will continue to trend CBC, maintain active type and screen.
40 yo man w/history of multiple prior lower GI bleeds a/w SOB, lightheadedness, and palpitations iso anemia 2/2 bleeding internal hemorrhoids; bleeding is ongoing.  Patient is scheduled for hemorrhoid surgery 1/31/21 .  F/w CBC, transfuse PRN , keep HB >7.
40 yo man w/history of multiple prior lower GI bleeds a/w SOB, lightheadedness, and palpitations iso anemia 2/2 bleeding internal hemorrhoids.  Patient had minimal bleeding this morning, monitor CBC , transfuse PRBC for HB <7, scheduled for colorectal surgery Monday , GI consult also appreciated.
41W with PMH of gastric ulcers, internal hemorrhoids who presents to ED with palpitations and dyspnea x 1 week in the setting of symptomatic anemia due to GIB. Patient also w/recent NSAID use.    Hgb declining this AM, still with bloody stools. Transfusing 1U pRBC, GI to perform colonoscopy/endoscopy. Will continue to trend CBC, maintain active T&S.
41W with PMH of gastric ulcers, internal hemorrhoids who presents to ED with palpitations and dyspnea x 1 week in the setting of symptomatic anemia due to GIB. Patient also w/recent NSAID use.    Hgb slowly improving after blood transfusion. GI following. To undergo EGD/colonoscopy tomorrow. Will trend CBC q12h, maintain active T&S.
41W with PMH of gastric ulcers, internal hemorrhoids who presents to ED with palpitations and dyspnea x 1 week in the setting of symptomatic anemia due to GIB. Patient also w/recent NSAID use.    Hgb stable this AM, still with bloody stools. Colonoscopy revealed internal hemorrhoids. GI recs reviewed. CRS evaluation pending for further management of bleeding hemorrhoids requiring blood transfusions. Will continue to trend CBC, aim for Hgb >7.0.
plan for hemorhhoidectomy today as per CLR surgery.  follow up with further recs.  d/c planning.

## 2022-02-01 NOTE — PROGRESS NOTE ADULT - ATTENDING SUPERVISION STATEMENT
Resident
Fellow
Resident
Fellow
Resident

## 2022-02-01 NOTE — PROGRESS NOTE ADULT - SUBJECTIVE AND OBJECTIVE BOX
PROGRESS NOTE:   Bbo Shelley, PGY-1  On TEAMS  NS: [phone], J: [id number]    Patient is a 41y old  Male who presents with a chief complaint of GIB (29 Jan 2022 09:30)      SUBJECTIVE / OVERNIGHT EVENTS:       ADDITIONAL REVIEW OF SYSTEMS: Negative except as noted above    MEDICATIONS  (STANDING):  hydrocortisone hemorrhoidal Suppository 1 Suppository(s) Rectal daily  influenza   Vaccine 0.5 milliLiter(s) IntraMuscular once  pantoprazole    Tablet 40 milliGRAM(s) Oral before breakfast  polyethylene glycol 3350 17 Gram(s) Oral daily  senna 2 Tablet(s) Oral at bedtime    MEDICATIONS  (PRN):  acetaminophen     Tablet .. 650 milliGRAM(s) Oral every 6 hours PRN Temp greater or equal to 38C (100.4F), Mild Pain (1 - 3), Moderate Pain (4 - 6), Severe Pain (7 - 10)  melatonin 3 milliGRAM(s) Oral at bedtime PRN Sleep      CAPILLARY BLOOD GLUCOSE        I&O's Summary      PHYSICAL EXAM:  Vital Signs Last 24 Hrs  T(C): 36.8 (30 Jan 2022 06:51), Max: 36.9 (29 Jan 2022 13:14)  T(F): 98.2 (30 Jan 2022 06:51), Max: 98.4 (29 Jan 2022 13:14)  HR: 70 (30 Jan 2022 06:51) (65 - 71)  BP: 133/84 (30 Jan 2022 06:51) (129/66 - 147/72)  BP(mean): --  RR: 18 (30 Jan 2022 06:51) (18 - 18)  SpO2: 100% (30 Jan 2022 06:51) (100% - 100%)    CONSTITUTIONAL: NAD, lying in bed comfortably  RESPIRATORY: Normal respiratory effort; CTABL  CARDIOVASCULAR: Regular rate and rhythm, normal S1 and S2, no murmur/rub/gallop  ABDOMEN: Soft, nondistended, nontender to palpation, normoactive bowel sounds, no rebound/guarding  MUSCLOSKELETAL: no joint swelling or tenderness to palpation, FROM all extremities  NEURO: AAOx3 to person, place, and time, full and equal strength all extremities   EXTREMITIES: no pedal edema    LABS:                        8.1    6.12  )-----------( 352      ( 29 Jan 2022 08:15 )             25.3     01-29    139  |  105  |  10  ----------------------------<  89  4.0   |  24  |  1.20    Ca    8.5      29 Jan 2022 08:15  Phos  3.9     01-29  Mg     1.90     01-29                  RADIOLOGY & ADDITIONAL TESTS:     PROGRESS NOTE:   Bob Shelley, PGY-1  On TEAMS  NS: [phone], [facility]: [id number]    Patient is a 41y old  Male who presents with a chief complaint of GIB (29 Jan 2022 09:30)      SUBJECTIVE / OVERNIGHT EVENTS: Tolerating hemorrhoidectomy well. Spoke with colorectal surgery this morning. Packing taken out this morning by patient, ok with colorectal surgery. Patient has had 3x BMs soft and loose since yesterday, one since packing came out. Denies bloody bowel movements. Has pain with defecation but well managed with current regimen.       ADDITIONAL REVIEW OF SYSTEMS: Negative except as noted above    MEDICATIONS  (STANDING):  hydrocortisone hemorrhoidal Suppository 1 Suppository(s) Rectal daily  influenza   Vaccine 0.5 milliLiter(s) IntraMuscular once  pantoprazole    Tablet 40 milliGRAM(s) Oral before breakfast  polyethylene glycol 3350 17 Gram(s) Oral daily  senna 2 Tablet(s) Oral at bedtime    MEDICATIONS  (PRN):  acetaminophen     Tablet .. 650 milliGRAM(s) Oral every 6 hours PRN Temp greater or equal to 38C (100.4F), Mild Pain (1 - 3), Moderate Pain (4 - 6), Severe Pain (7 - 10)  melatonin 3 milliGRAM(s) Oral at bedtime PRN Sleep      CAPILLARY BLOOD GLUCOSE        I&O's Summary      PHYSICAL EXAM:  Vital Signs Last 24 Hrs  T(C): 36.8 (30 Jan 2022 06:51), Max: 36.9 (29 Jan 2022 13:14)  T(F): 98.2 (30 Jan 2022 06:51), Max: 98.4 (29 Jan 2022 13:14)  HR: 70 (30 Jan 2022 06:51) (65 - 71)  BP: 133/84 (30 Jan 2022 06:51) (129/66 - 147/72)  BP(mean): --  RR: 18 (30 Jan 2022 06:51) (18 - 18)  SpO2: 100% (30 Jan 2022 06:51) (100% - 100%)    CONSTITUTIONAL: NAD, lying in bed comfortably  RESPIRATORY: Normal respiratory effort; CTABL  CARDIOVASCULAR: Regular rate and rhythm, normal S1 and S2, no murmur/rub/gallop  ABDOMEN: Soft, nondistended, nontender to palpation, normoactive bowel sounds, no rebound/guarding  MUSCLOSKELETAL: no joint swelling or tenderness to palpation, FROM all extremities  NEURO: AAOx3 to person, place, and time, full and equal strength all extremities   EXTREMITIES: no pedal edema    LABS:                        8.1    6.12  )-----------( 352      ( 29 Jan 2022 08:15 )             25.3     01-29    139  |  105  |  10  ----------------------------<  89  4.0   |  24  |  1.20    Ca    8.5      29 Jan 2022 08:15  Phos  3.9     01-29  Mg     1.90     01-29                  RADIOLOGY & ADDITIONAL TESTS:

## 2022-02-01 NOTE — DIETITIAN INITIAL EVALUATION ADULT. - ORAL INTAKE PTA/DIET HISTORY
Patient seen for assessment. Reports good appetite PTA. Reports following a well balanced diet at home.

## 2022-02-01 NOTE — PROGRESS NOTE ADULT - PROVIDER SPECIALTY LIST ADULT
Colorectal Surgery
Gastroenterology
Gastroenterology
Colorectal Surgery
Internal Medicine

## 2022-02-01 NOTE — PROGRESS NOTE ADULT - SUBJECTIVE AND OBJECTIVE BOX
SURGERY DAILY PROGRESS NOTE:       SUBJECTIVE/ROS: No acute overnight events. Patient seen and examined at bedside during morning rounds. Complaining of some rectal discomfort; tolerating diet, ambulating and voiding appropriately.    OBJECTIVE:    Vital Signs Last 24 Hrs  T(C): 36.5 (01 Feb 2022 06:56), Max: 36.9 (31 Jan 2022 10:33)  T(F): 97.7 (01 Feb 2022 06:56), Max: 98.5 (31 Jan 2022 21:15)  HR: 74 (01 Feb 2022 06:56) (74 - 104)  BP: 134/72 (01 Feb 2022 06:56) (130/69 - 164/66)  BP(mean): 90 (31 Jan 2022 14:15) (88 - 91)  RR: 18 (01 Feb 2022 06:56) (12 - 19)  SpO2: 98% (01 Feb 2022 06:56) (98% - 100%)    I&O's Detail    31 Jan 2022 07:01  -  01 Feb 2022 07:00  --------------------------------------------------------  IN:    Oral Fluid: 500 mL  Total IN: 500 mL    OUT:  Total OUT: 0 mL    Total NET: 500 mL    PHYSICAL EXAM  General: lying in bed, NAD  Resp: no increased WOB  Abd: soft, non-distended, non-tender    LABS:                        7.6    10.76 )-----------( 381      ( 01 Feb 2022 07:16 )             23.5     02-01    134<L>  |  101  |  11  ----------------------------<  106<H>  3.6   |  23  |  1.14    Ca    8.3<L>      01 Feb 2022 07:16  Phos  3.0     02-01  Mg     1.90     02-01      PT/INR - ( 31 Jan 2022 04:43 )   PT: 12.6 sec;   INR: 1.11 ratio         PTT - ( 31 Jan 2022 04:43 )  PTT:31.2 sec

## 2022-02-01 NOTE — DIETITIAN INITIAL EVALUATION ADULT. - OTHER INFO
41 year old male with a PMH of multiple prior lower GI bleeds a/w SOB, lightheadedness, and palpitations in setting of anemia 2/2 bleeding internal hemorrhoids per chart.    Patient reports good appetite in house. Reports no GI distress at this time or chewing/swallowing difficulties. Food preferences reviewed. Has food allergy to shellfish. Reports UBW has been trending at 235 lbs. ABW is 235 lbs. (1/31) per chart. indicating stable weight. No edema or pressure injuries noted per RN flow sheet.    Reviewed high fiber nutrition therapy. Discussed food sources to consider known to be high in fiber. Patient demonstrated good previous knowledge of diet. Provided patient high fiber nutrition therapy handout.

## 2022-02-01 NOTE — PROGRESS NOTE ADULT - ASSESSMENT
ASSESSMENT: 41M presenting with GI bleed and grade 3 internal hemorrhoid. S/p hemorrhoidectomy 1/31.     Plan:  - From surgery standpoint, patient is cleared for discharge today  - He should make an appointment to follow up in clinic with Dr. Quintero in two weeks after discharge  - May take Tyl/Motrin for pain control as needed    [facility], [id number]

## 2022-02-01 NOTE — DIETITIAN INITIAL EVALUATION ADULT. - PROBLEM/PLAN-2
Detail Level: Detailed
Products Recommended: SPF 30+ with titanium or zinc oxide
General Sunscreen Counseling: I recommended a broad spectrum sunscreen with a SPF of 30 or higher.  I explained that SPF 30 sunscreens block approximately 97 percent of the sun's harmful rays.  Sunscreens should be applied at least 15 minutes prior to expected sun exposure and then every 2 hours after that as long as sun exposure continues. If swimming or exercising sunscreen should be reapplied every 45 minutes to an hour after getting wet or sweating.  One ounce, or the equivalent of a shot glass full of sunscreen, is adequate to protect the skin not covered by a bathing suit. I also recommended a lip balm with a sunscreen as well. Sun protective clothing can be used in lieu of sunscreen but must be worn the entire time you are exposed to the sun's rays.
DISPLAY PLAN FREE TEXT

## 2022-02-01 NOTE — PROGRESS NOTE ADULT - PROBLEM SELECTOR PROBLEM 1
Anemia due to acute blood loss

## 2022-02-03 PROBLEM — Z00.00 ENCOUNTER FOR PREVENTIVE HEALTH EXAMINATION: Status: ACTIVE | Noted: 2022-02-03

## 2022-02-09 LAB — SURGICAL PATHOLOGY STUDY: SIGNIFICANT CHANGE UP

## 2022-02-15 ENCOUNTER — APPOINTMENT (OUTPATIENT)
Dept: COLORECTAL SURGERY | Facility: CLINIC | Age: 42
End: 2022-02-15
Payer: COMMERCIAL

## 2022-02-15 VITALS
TEMPERATURE: 98.9 F | HEART RATE: 75 BPM | OXYGEN SATURATION: 100 % | DIASTOLIC BLOOD PRESSURE: 82 MMHG | SYSTOLIC BLOOD PRESSURE: 185 MMHG

## 2022-02-15 DIAGNOSIS — D64.9 ANEMIA, UNSPECIFIED: ICD-10-CM

## 2022-02-15 DIAGNOSIS — K64.9 UNSPECIFIED HEMORRHOIDS: ICD-10-CM

## 2022-02-15 PROCEDURE — 99024 POSTOP FOLLOW-UP VISIT: CPT

## 2022-02-15 RX ORDER — MULTIVITAMIN
TABLET ORAL
Refills: 0 | Status: ACTIVE | COMMUNITY

## 2022-02-15 NOTE — HISTORY OF PRESENT ILLNESS
[FreeTextEntry1] : Status post hemorrhoidectomy for chronically bleeding internal hemorrhoid. Patient progressing well. Tolerating diet. No fevers or chills normal bowel movements no bleeding reported no prolapse of tissue

## 2022-02-15 NOTE — ASSESSMENT
[FreeTextEntry1] : Bleeding internal hemorrhoids and anemia\par -Patient progressing well\par -Continue bowel regimen\par -Follow up in 6 weeks for reevaluation

## 2022-03-29 ENCOUNTER — APPOINTMENT (OUTPATIENT)
Dept: COLORECTAL SURGERY | Facility: CLINIC | Age: 42
End: 2022-03-29

## 2022-06-18 NOTE — DISCHARGE NOTE NURSING/CASE MANAGEMENT/SOCIAL WORK - NSDCPEFALRISK_GEN_ALL_CORE
For information on Fall & Injury Prevention, visit: [email or web address] OR  [email or web address] OR  https://www.cdc.gov/steadi/patient.html Assistance with ambulation/Assistance OOB with selected safe patient handling equipment/Communicate Risk of Fall with Harm to all staff/Discuss with provider need for PT consult/Monitor for mental status changes/Monitor gait and stability/Move patient closer to nurses' station/Reinforce activity limits and safety measures with patient and family/Reorient to person, place and time as needed/Tailored Fall Risk Interventions/Toileting schedule using arm’s reach rule for commode and bathroom/Use of alarms - bed, chair and/or voice tab/Visual Cue: Yellow wristband and red socks/Bed in lowest position, wheels locked, appropriate side rails in place/Call bell, personal items and telephone in reach/Instruct patient to call for assistance before getting out of bed or chair/Non-slip footwear when patient is out of bed/[place] to call system/Physically safe environment - no spills, clutter or unnecessary equipment/Purposeful Proactive Rounding/Room/bathroom lighting operational, light cord in reach

## 2022-09-25 ENCOUNTER — EMERGENCY (EMERGENCY)
Facility: HOSPITAL | Age: 42
LOS: 1 days | Discharge: HOME | End: 2022-09-25
Admitting: EMERGENCY MEDICINE

## 2022-09-25 VITALS
DIASTOLIC BLOOD PRESSURE: 95 MMHG | SYSTOLIC BLOOD PRESSURE: 158 MMHG | HEIGHT: 71 IN | OXYGEN SATURATION: 100 % | TEMPERATURE: 98 F | HEART RATE: 110 BPM | RESPIRATION RATE: 16 BRPM

## 2022-09-25 VITALS
HEART RATE: 75 BPM | RESPIRATION RATE: 18 BRPM | TEMPERATURE: 98 F | DIASTOLIC BLOOD PRESSURE: 84 MMHG | SYSTOLIC BLOOD PRESSURE: 145 MMHG | OXYGEN SATURATION: 100 %

## 2022-09-25 PROCEDURE — 73080 X-RAY EXAM OF ELBOW: CPT | Mod: 26,LT

## 2022-09-25 PROCEDURE — 73060 X-RAY EXAM OF HUMERUS: CPT | Mod: 26,LT

## 2022-09-25 PROCEDURE — 73090 X-RAY EXAM OF FOREARM: CPT | Mod: 26,LT

## 2022-09-25 PROCEDURE — 99284 EMERGENCY DEPT VISIT MOD MDM: CPT

## 2022-09-25 RX ORDER — OXYCODONE HYDROCHLORIDE 5 MG/1
2.5 TABLET ORAL
Qty: 40 | Refills: 0
Start: 2022-09-25 | End: 2022-09-28

## 2022-09-25 RX ORDER — ACETAMINOPHEN 500 MG
975 TABLET ORAL ONCE
Refills: 0 | Status: COMPLETED | OUTPATIENT
Start: 2022-09-25 | End: 2022-09-25

## 2022-09-25 RX ORDER — OXYCODONE HYDROCHLORIDE 5 MG/1
2.5 TABLET ORAL ONCE
Refills: 0 | Status: DISCONTINUED | OUTPATIENT
Start: 2022-09-25 | End: 2022-09-25

## 2022-09-25 RX ADMIN — Medication 975 MILLIGRAM(S): at 12:15

## 2022-09-25 RX ADMIN — OXYCODONE HYDROCHLORIDE 2.5 MILLIGRAM(S): 5 TABLET ORAL at 12:15

## 2022-09-25 NOTE — ED PROVIDER NOTE - PROGRESS NOTE DETAILS
PA SHEARER:  Xray without fracture or dislocation.  Pt reports improvement in pain.  Pt medically stable for discharge.  Strict return precautions given.  Pt to follow up with PMD and orthopedics (referral list provided).

## 2022-09-25 NOTE — ED PROVIDER NOTE - PHYSICAL EXAMINATION
LUE:  5/5 strength of digits, wrist; 5/5 strength with opposition of digits; < 2 sec capillary refill; 2+ pulses; sensation intact to light touch; strength exam at elbow limited 2/2 pain; no swelling; no redness; no warmth

## 2022-09-25 NOTE — ED ADULT NURSE NOTE - OBJECTIVE STATEMENT
Break RN: patient arrives to the ER A&Ox4, ambulatory, c/o L arm pain status post catching his 55lb dog while the dog was running around. patient is well appearing, no acute distress noted. patient c/o pain upon movement. medication given and tolerated well per EMAR. patient educated on fall risk status post receiving pain medicine. all safety maintained at this time.

## 2022-09-25 NOTE — ED PROVIDER NOTE - OBJECTIVE STATEMENT
Pt is a 43 y/o M PMHx asthma, left forearm tendon repair ~20 years ago p/w left forearm pain since yesterday.  Pt reports he grabbed to  ~50 lb dog with which pt developed sudden onset pain at left proximal forearm and antecubital region with an associated "pop" sound.  Pt reports pain worsens with pronation/supination.  Pt denies any fevers, chills, numbness, weakness, paresthesias, swelling.

## 2022-09-25 NOTE — ED PROVIDER NOTE - NSFOLLOWUPINSTRUCTIONS_ED_ALL_ED_FT
Advance activity as tolerated.  Continue all previously prescribed medications as directed unless otherwise instructed.  Keep extremity elevated and wrapped.  Apply cool compresses to affected area for 15 minutes, 3-4 times per day. Take Motrin (also sold as Advil or Ibuprofen) 400-600 mg (two or three 200 mg over the counter pills) every 8 hours as needed for moderate pain or fevers-- take with food. Take Tylenol 650mg (Two 325 mg pills) every 4-6 hours as needed for pain or fevers.  Follow up with your primary care physician and orthopedics (referral list provided or call [phone] to make an appointment with the orthopedics clinic)  in 48-72 hours- bring copies of your results.  Return to the ER for worsening or persistent symptoms, including but not limited to worsening/persistent pain, swelling, redness, numbness, weakness, fevers, and/or ANY NEW OR CONCERNING SYMPTOMS. If you have issues obtaining follow up, please call: [phone] to obtain a doctor or specialist who takes your insurance in your area.  You may call [phone] to make an appointment with the internal medicine clinic. Advance activity as tolerated.  Continue all previously prescribed medications as directed unless otherwise instructed.  Keep extremity elevated and wrapped.  Apply cool compresses to affected area for 15 minutes, 3-4 times per day. Take Motrin (also sold as Advil or Ibuprofen) 400-600 mg (two or three 200 mg over the counter pills) every 8 hours as needed for moderate pain or fevers-- take with food. Take Tylenol 650mg (Two 325 mg pills) every 4-6 hours as needed for pain or fevers.  Take oxycodone 2.5 mg one pill once every 6 hours as needed for severe pain -- causes drowsiness; DO NOT drink alcohol, drive, or operate heavy machinery with this medication.  Caution federal law prohibits the transfer of this drug to any person other  than the person for whom it was prescribed. May cause drowsiness.  Alcohol may intensify this effect.  Use care when operating dangerous machinery.  This prescription cannot be refilled. Using more of this medication than prescribed may cause serious breathing problems.  Follow up with your primary care physician and orthopedics (referral list provided or call [phone] to make an appointment with the orthopedics clinic)  in 48-72 hours- bring copies of your results.  Return to the ER for worsening or persistent symptoms, including but not limited to worsening/persistent pain, swelling, redness, numbness, weakness, fevers, and/or ANY NEW OR CONCERNING SYMPTOMS. If you have issues obtaining follow up, please call: [phone] to obtain a doctor or specialist who takes your insurance in your area.  You may call [phone] to make an appointment with the internal medicine clinic. Advance activity as tolerated.  Continue all previously prescribed medications as directed unless otherwise instructed.  Keep extremity elevated and wrapped.  Apply cool compresses to affected area for 15 minutes, 3-4 times per day. Take Motrin (also sold as Advil or Ibuprofen) 400-600 mg (two or three 200 mg over the counter pills) every 8 hours as needed for moderate pain or fevers-- take with food. Take Tylenol 650mg (Two 325 mg pills) every 4-6 hours as needed for pain or fevers.  Take oxycodone (5 mg/5 mL solution) 2.5 milliliters once every 6 hours as needed for severe pain -- causes drowsiness; DO NOT drink alcohol, drive, or operate heavy machinery with this medication.  Caution federal law prohibits the transfer of this drug to any person other  than the person for whom it was prescribed. May cause drowsiness.  Alcohol may intensify this effect.  Use care when operating dangerous machinery.  This prescription cannot be refilled. Using more of this medication than prescribed may cause serious breathing problems.  Follow up with your primary care physician and orthopedics (referral list provided or call [phone] to make an appointment with the orthopedics clinic)  in 48-72 hours- bring copies of your results.  Return to the ER for worsening or persistent symptoms, including but not limited to worsening/persistent pain, swelling, redness, numbness, weakness, fevers, and/or ANY NEW OR CONCERNING SYMPTOMS. If you have issues obtaining follow up, please call: [phone] to obtain a doctor or specialist who takes your insurance in your area.  You may call [phone] to make an appointment with the internal medicine clinic.

## 2022-09-25 NOTE — ED ADULT NURSE NOTE - CHIEF COMPLAINT QUOTE
VSS. A & O x4. NG to LIS. 1600 of output this shift from NG. Patient states he is feeling much better. NPO with ice chips. Bowel sounds active. Hiccups and burping occasionally but denies passing gas. Ambulated in palacio x1. Denies any need for pain medication. Denies nausuea. No skin issues. Will continue to monitor.   pt hurts 3 pops in left arm after grabbing his dog  c/o pain on movement

## 2022-09-25 NOTE — ED PROVIDER NOTE - UPPER EXTREMITY EXAM, LEFT
+tenderness at musculature of proximal volar forearm; no redness; no swelling; no palpable deformities; no point bony tenderness

## 2022-09-25 NOTE — ED PROVIDER NOTE - DOMESTIC TRAVEL HIGH RISK QUESTION
Thuan Brooks is a 74 y.o. female who presents today with   Chief Complaint   Patient presents with    Diabetes    Medication Adjustment     Insulin changes     Peripheral Neuropathy    Hyperlipidemia       /76 (Site: Left Arm, Position: Sitting, Cuff Size: Large Adult)   Pulse 94   Resp 16   Ht 5' (1.524 m)   Wt 205 lb (93 kg)   SpO2 98%   BMI 40.04 kg/m²      SUBJECTIVE:    HPI     Thuan Brooks is a pleasant 62-year-old female who presents today for the reasons noted above in the chief complaint. She endorses polyuria, polydipsia, denies blurry vision, chest pain, dyspnea or claudication. No foot burning, but endorses numbness or pain. Taking medication compliantly without noted sided effects. Follows diet fairly well. Home glucose monitoring in the range of 136-238. Has not seen diabetic educator, will refer today. Last eye exam was within the year was reportedly negative. Last foot exam negative within the past year. She endorses decreased appetite and early satiety, polydipsia, polyphagia, polyuria, urinary frequency, arthralgias, intermittent dizziness and lightheadedness when  her blood sugar is elevated. 10 systems were reviewed and are negative except as noted in the HPI.     Past Medical History:   Diagnosis Date    Diabetes mellitus ([address]     Gout     Hyperlipidemia     Hypertension     Kidney stone     Neuropathy (HCC)     Osteoarthritis     Shingles     Type 2 diabetes mellitus without complication (HCC)      Past Surgical History:   Procedure Laterality Date    KIDNEY SURGERY      Stents- 3 kidney procedures      Family History   Problem Relation Age of Onset    No Known Problems Mother     COPD Father     No Known Problems Sister     No Known Problems Brother     Other Maternal Grandmother      bowel obstruction    Substance Abuse Maternal Grandfather     No Known Problems Paternal Grandmother     Cancer Paternal Grandfather     No Known Problems Son      Social History   Substance Use Topics    Smoking status: Never Smoker    Smokeless tobacco: Never Used    Alcohol use No     Outpatient Prescriptions Marked as Taking for the 12/5/17 encounter (Office Visit) with Marie Rashid CNP   Medication Sig Dispense Refill    glyBURIDE (DIABETA) 5 MG tablet Take 1 tablet by mouth 4 times daily 120 tablet 3    gabapentin (NEURONTIN) 800 MG tablet Take 1 tablet by mouth 3 times daily 90 tablet 3    allopurinol (ZYLOPRIM) 300 MG tablet Take 1 tablet by mouth daily Patient takes only as needed for Gout 30 tablet 3    Insulin Pen Needle (PEN NEEDLES) 31G X 6 MM MISC 1 each by Does not apply route daily 100 each 3    lisinopril (PRINIVIL;ZESTRIL) 20 MG tablet Take 1 tablet by mouth daily 30 tablet 3    metFORMIN (GLUCOPHAGE) 500 MG tablet Take 1 tablet by mouth 4 times daily 120 tablet 3       Review of Systems   Constitutional: Positive for appetite change. Negative for activity change, fatigue, fever and unexpected weight change. Decreased Appetite and early satiety   HENT: Negative for congestion, ear pain, facial swelling, sinus pressure, sneezing, sore throat and trouble swallowing. Eyes: Negative for photophobia, pain, redness, itching and visual disturbance. Respiratory: Negative for cough, chest tightness and shortness of breath. Cardiovascular: Negative for chest pain, palpitations and leg swelling. Gastrointestinal: Negative for abdominal pain, blood in stool, constipation, diarrhea, nausea and vomiting. Endocrine: Positive for polydipsia, polyphagia and polyuria. Frequent urination and fluid intake when BS in increased. Genitourinary: Positive for frequency. Negative for difficulty urinating, dysuria, flank pain, hematuria and urgency. Musculoskeletal: Positive for arthralgias. Negative for back pain and joint swelling. Right and Left Hand pain   Skin: Negative for color change and rash.    Allergic/Immunologic: Negative for environmental allergies, food allergies and immunocompromised state. Neurological: Positive for dizziness and light-headedness. Negative for seizures, speech difficulty, weakness, numbness and headaches. Dizziness and lightheaded associated with high BS. BS this AM- 238   Hematological: Does not bruise/bleed easily. Psychiatric/Behavioral: Negative for agitation. The patient is not nervous/anxious. OBJECTIVE:      Physical Exam   Constitutional: She is oriented to person, place, and time. She appears well-developed and well-nourished. HENT:   Head: Normocephalic. Eyes: Conjunctivae and EOM are normal. Pupils are equal, round, and reactive to light. Right eye exhibits no discharge. Neck: Normal range of motion. No thyromegaly present. Cardiovascular: Normal rate, regular rhythm, normal heart sounds and intact distal pulses. No murmur heard. Pulmonary/Chest: Effort normal and breath sounds normal. She has no wheezes. She exhibits no tenderness. Abdominal: Bowel sounds are normal. She exhibits distension. There is no tenderness. There is no rebound and no guarding. Genitourinary: Vagina normal and uterus normal. No vaginal discharge found. Musculoskeletal: Normal range of motion. She exhibits tenderness. She exhibits no edema. Right and Left Hand pain. Neurological: She is alert and oriented to person, place, and time. She has normal reflexes. Skin: Skin is warm. No rash noted. She is not diaphoretic. No erythema. Psychiatric: She has a normal mood and affect. Her behavior is normal. Judgment and thought content normal.       No results found for requested labs within last 30 days.      Hemoglobin A1C (%)   Date Value   10/04/2017 9.1     LDL Calculated (mg/dL)   Date Value   10/18/2017 91       Lab Results   Component Value Date    WBC 5.9 10/18/2017    WBC 5.4 11/19/2016    WBC 4.7 10/29/2016    NEUTROABS 3.4 10/18/2017    HGB 12.9 10/18/2017    HGB 13.8 11/19/2016    HGB 13.0 10/29/2016    HCT 37.7 10/18/2017    HCT 41.7 11/19/2016    HCT 38.3 10/29/2016    MCV 93.9 10/18/2017    MCV 93.5 11/19/2016    MCV 93.6 10/29/2016     10/18/2017     11/19/2016     10/29/2016    SEGSABS 3.6 11/19/2016    SEGSABS 2.8 10/29/2016    LYMPHSABS 1.8 10/18/2017    MONOSABS 0.4 10/18/2017    EOSABS 0.2 10/18/2017    BASOSABS 0.1 10/18/2017     Lab Results   Component Value Date    TSH 3.93 10/18/2017    TSHHS 2.310 11/19/2016     Lab Results   Component Value Date    LABALBU 3.4 10/18/2017    BILITOT 0.4 10/18/2017    AST 26 10/18/2017    ALT 36 10/18/2017    ALKPHOS 57 10/18/2017        Controlled Substances Monitoring:  not receiving controlled substances    ASSESSMENT AND PLAN:     1. Type 2 diabetes mellitus with diabetic polyneuropathy, with long-term current use of insulin ([facility] [address]  · Insurance would not cover Soliqa  · Resume and change Novolin 70/30 to 35 am (same dose), from 30 to 32 units in the evening  · Continue glyBURIDE (DIABETA) 5 MG tablet; Take 1 tablet by mouth 4 times daily  Dispense: 120 tablet; Refill: 3  · Continue gabapentin (NEURONTIN) 800 MG tablet; Take 1 tablet by mouth 3 times daily  Dispense: 90 tablet; Refill: 3  · Amb External Referral To Nutrition Services  · Insulin Pen Needle (PEN NEEDLES) 31G X 6 MM MISC; 1 each by Does not apply route daily  Dispense: 100 each; Refill: 3  · Continue metFORMIN (GLUCOPHAGE) 500 MG tablet; Take 1 tablet by mouth 4 times daily  Dispense: 120 tablet; Refill: 3    2. Essential hypertension  · Controlled  · Continue lisinopril (PRINIVIL;ZESTRIL) 20 MG tablet; Take 1 tablet by mouth daily  Dispense: 30 tablet; Refill: 3    3. Secondary hypertriglyceridemia  · Anticipate this will improve as diabetes is better controlled    4. 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

## 2022-09-25 NOTE — ED PROVIDER NOTE - PATIENT PORTAL LINK FT
You can access the FollowMyHealth Patient Portal offered by [facility] by registering at the following website: [email or web address]. By joining [employer]’s FollowMyHealth portal, you will also be able to view your health information using other applications (apps) compatible with our system.

## 2022-09-25 NOTE — ED PROVIDER NOTE - CLINICAL SUMMARY MEDICAL DECISION MAKING FREE TEXT BOX
Pt is a 41 y/o M PMHx asthma, left forearm tendon repair ~20 years ago p/w left forearm pain since yesterday -- likely strain, r/o dislocation/fracture; no clinically concerning for compartment syndrome -- xray humerus/elbow/forearm

## 2023-01-17 ENCOUNTER — EMERGENCY (EMERGENCY)
Facility: HOSPITAL | Age: 43
LOS: 1 days | Discharge: HOME | End: 2023-01-17
Attending: STUDENT IN AN ORGANIZED HEALTH CARE EDUCATION/TRAINING PROGRAM | Admitting: STUDENT IN AN ORGANIZED HEALTH CARE EDUCATION/TRAINING PROGRAM
Payer: COMMERCIAL

## 2023-01-17 VITALS
TEMPERATURE: 100 F | RESPIRATION RATE: 18 BRPM | HEART RATE: 130 BPM | DIASTOLIC BLOOD PRESSURE: 119 MMHG | OXYGEN SATURATION: 99 % | SYSTOLIC BLOOD PRESSURE: 166 MMHG

## 2023-01-17 LAB
ALBUMIN SERPL ELPH-MCNC: 5 G/DL — SIGNIFICANT CHANGE UP (ref 3.3–5)
ALP SERPL-CCNC: 61 U/L — SIGNIFICANT CHANGE UP (ref 40–120)
ALT FLD-CCNC: 101 U/L — HIGH (ref 4–41)
ANION GAP SERPL CALC-SCNC: 13 MMOL/L — SIGNIFICANT CHANGE UP (ref 7–14)
AST SERPL-CCNC: 71 U/L — HIGH (ref 4–40)
BASOPHILS # BLD AUTO: 0.03 K/UL — SIGNIFICANT CHANGE UP (ref 0–0.2)
BASOPHILS NFR BLD AUTO: 0.5 % — SIGNIFICANT CHANGE UP (ref 0–2)
BILIRUB SERPL-MCNC: 0.8 MG/DL — SIGNIFICANT CHANGE UP (ref 0.2–1.2)
BUN SERPL-MCNC: 11 MG/DL — SIGNIFICANT CHANGE UP (ref 7–23)
CALCIUM SERPL-MCNC: 9.9 MG/DL — SIGNIFICANT CHANGE UP (ref 8.4–10.5)
CHLORIDE SERPL-SCNC: 100 MMOL/L — SIGNIFICANT CHANGE UP (ref 98–107)
CO2 SERPL-SCNC: 24 MMOL/L — SIGNIFICANT CHANGE UP (ref 22–31)
CREAT SERPL-MCNC: 1 MG/DL — SIGNIFICANT CHANGE UP (ref 0.5–1.3)
D DIMER BLD IA.RAPID-MCNC: <150 NG/ML DDU — SIGNIFICANT CHANGE UP
EGFR: 98 ML/MIN/1.73M2 — SIGNIFICANT CHANGE UP
EOSINOPHIL # BLD AUTO: 0.09 K/UL — SIGNIFICANT CHANGE UP (ref 0–0.5)
EOSINOPHIL NFR BLD AUTO: 1.6 % — SIGNIFICANT CHANGE UP (ref 0–6)
GLUCOSE SERPL-MCNC: 102 MG/DL — HIGH (ref 70–99)
HCT VFR BLD CALC: 50.1 % — HIGH (ref 39–50)
HGB BLD-MCNC: 16.7 G/DL — SIGNIFICANT CHANGE UP (ref 13–17)
IANC: 3.68 K/UL — SIGNIFICANT CHANGE UP (ref 1.8–7.4)
IMM GRANULOCYTES NFR BLD AUTO: 0.4 % — SIGNIFICANT CHANGE UP (ref 0–0.9)
LIDOCAIN IGE QN: 18 U/L — SIGNIFICANT CHANGE UP (ref 7–60)
LYMPHOCYTES # BLD AUTO: 1.21 K/UL — SIGNIFICANT CHANGE UP (ref 1–3.3)
LYMPHOCYTES # BLD AUTO: 21.5 % — SIGNIFICANT CHANGE UP (ref 13–44)
MCHC RBC-ENTMCNC: 27 PG — SIGNIFICANT CHANGE UP (ref 27–34)
MCHC RBC-ENTMCNC: 33.3 GM/DL — SIGNIFICANT CHANGE UP (ref 32–36)
MCV RBC AUTO: 81.1 FL — SIGNIFICANT CHANGE UP (ref 80–100)
MONOCYTES # BLD AUTO: 0.6 K/UL — SIGNIFICANT CHANGE UP (ref 0–0.9)
MONOCYTES NFR BLD AUTO: 10.7 % — SIGNIFICANT CHANGE UP (ref 2–14)
NEUTROPHILS # BLD AUTO: 3.68 K/UL — SIGNIFICANT CHANGE UP (ref 1.8–7.4)
NEUTROPHILS NFR BLD AUTO: 65.3 % — SIGNIFICANT CHANGE UP (ref 43–77)
NRBC # BLD: 0 /100 WBCS — SIGNIFICANT CHANGE UP (ref 0–0)
NRBC # FLD: 0 K/UL — SIGNIFICANT CHANGE UP (ref 0–0)
PLATELET # BLD AUTO: 342 K/UL — SIGNIFICANT CHANGE UP (ref 150–400)
POTASSIUM SERPL-MCNC: 3.3 MMOL/L — LOW (ref 3.5–5.3)
POTASSIUM SERPL-SCNC: 3.3 MMOL/L — LOW (ref 3.5–5.3)
PROT SERPL-MCNC: 8.6 G/DL — HIGH (ref 6–8.3)
RBC # BLD: 6.18 M/UL — HIGH (ref 4.2–5.8)
RBC # FLD: 19.3 % — HIGH (ref 10.3–14.5)
SODIUM SERPL-SCNC: 137 MMOL/L — SIGNIFICANT CHANGE UP (ref 135–145)
WBC # BLD: 5.63 K/UL — SIGNIFICANT CHANGE UP (ref 3.8–10.5)
WBC # FLD AUTO: 5.63 K/UL — SIGNIFICANT CHANGE UP (ref 3.8–10.5)

## 2023-01-17 PROCEDURE — 99285 EMERGENCY DEPT VISIT HI MDM: CPT

## 2023-01-17 PROCEDURE — 71046 X-RAY EXAM CHEST 2 VIEWS: CPT | Mod: 26

## 2023-01-17 RX ORDER — SODIUM CHLORIDE 9 MG/ML
1000 INJECTION INTRAMUSCULAR; INTRAVENOUS; SUBCUTANEOUS ONCE
Refills: 0 | Status: COMPLETED | OUTPATIENT
Start: 2023-01-17 | End: 2023-01-17

## 2023-01-17 RX ORDER — FAMOTIDINE 10 MG/ML
20 INJECTION INTRAVENOUS ONCE
Refills: 0 | Status: COMPLETED | OUTPATIENT
Start: 2023-01-17 | End: 2023-01-17

## 2023-01-17 RX ADMIN — Medication 30 MILLILITER(S): at 23:21

## 2023-01-17 RX ADMIN — SODIUM CHLORIDE 1000 MILLILITER(S): 9 INJECTION INTRAMUSCULAR; INTRAVENOUS; SUBCUTANEOUS at 22:54

## 2023-01-17 RX ADMIN — FAMOTIDINE 20 MILLIGRAM(S): 10 INJECTION INTRAVENOUS at 23:21

## 2023-01-17 NOTE — ED PROVIDER NOTE - PATIENT PORTAL LINK FT
You can access the FollowMyHealth Patient Portal offered by [facility] by registering at the following website: [email or web address]. By joining [employer]’s FollowMyHealth portal, you will also be able to view your health information using other applications (apps) compatible with our system.

## 2023-01-17 NOTE — ED PROVIDER NOTE - CARE PLAN
1 Principal Discharge DX:	Palpitations  Secondary Diagnosis:	Epigastric pain  Secondary Diagnosis:	Abnormal EKG   Principal Discharge DX:	Palpitations  Secondary Diagnosis:	Epigastric pain  Secondary Diagnosis:	Abnormal EKG  Secondary Diagnosis:	Hypokalemia

## 2023-01-17 NOTE — ED PROVIDER NOTE - CLINICAL SUMMARY MEDICAL DECISION MAKING FREE TEXT BOX
43 Y/O M PMH Childhood Asthma PSH Hemorrhoidectomy, L Bicep Rupture S/P repair C/O dyspepsia for the past 3 days and palpitations which he states began at 2PM today. Pt states he had coffee with an expresso shot at 8AM but denies caffeine use since, denies cigarette use but endorses vape use, denies drug use. Pt denies dyspnea, pt states he went to urgent care where his HR was as high as the 140s. Plan is labs to eval for anemia or electrolyte disturbance, TSH to eval for hyperthyroidism, D-Dimer to eval for PE, CXR to eval for consolidation or pneumothorax or radiographic cardiomegaly. Will hydrate, provide Pepcid and Maalox for dyspepsia, + LUQ tenderness on exam (mild), reassess.

## 2023-01-17 NOTE — ED ADULT NURSE NOTE - OBJECTIVE STATEMENT
Patient received in room 18. Patient referred to the ED from urgent care for palpitations. Patient A&Ox4 and ambulatory at baseline. PMH stomach ulcers. Patient states for approximately 2 weeks he has had heartburn and today had a new onset of palpitations. Patient denies dyspnea, shortness of breath, and chest pain. Airway patent, chest rise equal bilaterally, lung sounds clear and equal bilaterally, respirations even and unlabored; patient able to speak in complete sentences. Abdomen flat, soft and non-distended; patient denies changes in BMs/urine. Pulse/motor/sensory present in all four extremities, no edema noted to bilateral upper and lower extremities. 20G IV placed to right AC, labs collected and sent, medications administered as prescribed. Steady gait observed, safety measures in place.

## 2023-01-17 NOTE — ED PROVIDER NOTE - NSFOLLOWUPINSTRUCTIONS_ED_ALL_ED_FT
Follow up with your primary doctor and see a Cardiologist. The ER will contact you to arrange follow up. You declined to be admitted today, return to the ER if you reconsider. Advance activity as tolerated.  Continue all previously prescribed medications as directed.  Follow up with your primary care physician in 48-72 hours- bring copies of your results.  Return to the ER for worsening or persistent symptoms, and/or ANY NEW OR CONCERNING SYMPTOMS. If you have issues obtaining follow up, please call: [phone] to obtain a doctor or specialist who takes your insurance in your area.  You may call [phone] to make an appointment with the internal medicine clinic.

## 2023-01-17 NOTE — ED ADULT TRIAGE NOTE - CHIEF COMPLAINT QUOTE
Sent from urgent care for evaluation of palpitations, chest pressure, sweating and heart burn starting around 2pm. Denies chest pain, dizziness, n/v. EKG showed sinus tach at  and advised to come to ER. No pmhx

## 2023-01-17 NOTE — ED PROVIDER NOTE - ATTENDING APP SHARED VISIT CONTRIBUTION OF CARE
42-year-old male with past medical history of asthma presents to ED for evaluation of palpitations since 2 PM today.  Reports he drank espresso this morning and has been under a lot of stress.  Had similar symptoms 1 year ago with negative cardiac work-up at that time.  Has family history of cardiac disease.  Denies nausea vomiting, recent illness. PE: well appearing, RRR, CTA BL A/P Labs, imaging, medicate, reassess

## 2023-01-17 NOTE — ED PROVIDER NOTE - NS ED ATTENDING STATEMENT MOD
This was a shared visit with the RAMILA. I reviewed and verified the documentation and independently performed the documented:

## 2023-01-17 NOTE — ED PROVIDER NOTE - PROGRESS NOTE DETAILS
PATTI Hwang: Pt was reassessed, recommended admission for further eval of pt's abnormal ekg and tachycardia. Pt declines, states he would rather follow up outpatient. Will D/C with Cardiology follow up.

## 2023-01-17 NOTE — ED ADULT NURSE NOTE - NSIMPLEMENTINTERV_GEN_ALL_ED
Implemented All Universal Safety Interventions:  [place] to call system. Call bell, personal items and telephone within reach. Instruct patient to call for assistance. Room bathroom lighting operational. Non-slip footwear when patient is off stretcher. Physically safe environment: no spills, clutter or unnecessary equipment. Stretcher in lowest position, wheels locked, appropriate side rails in place.

## 2023-01-17 NOTE — ED PROVIDER NOTE - GASTROINTESTINAL, MLM
Abdomen soft, non-tender, no guarding. Mild LUQ tenderness on exam, no rebound or guarding, no other tenderness, Neg Matamoros's Sign, no palpable masses.

## 2023-01-17 NOTE — ED PROVIDER NOTE - OBJECTIVE STATEMENT
43 Y/O M PMH Childhood Asthma PSH Hemorrhoidectomy, L Bicep Rupture S/P repair C/O dyspepsia for the past 3 days and palpitations which he states began at 2PM today. 43 Y/O M PMH Childhood Asthma PSH Hemorrhoidectomy, L Bicep Rupture S/P repair C/O dyspepsia for the past 3 days and palpitations which he states began at 2PM today. Pt states he had coffee with an expresso shot at 8AM but denies caffeine use since, denies cigarette use but endorses vape use, denies drug use. Pt denies dyspnea, pt states he went to urgent care where his HR was as high as the 140s. Pt states his father had a fatal MI at age 68 but states his father was a smoker. Pt denies any other sx or acute complaints, states he has not had cardiac testing prior.

## 2023-01-18 VITALS — SYSTOLIC BLOOD PRESSURE: 150 MMHG | DIASTOLIC BLOOD PRESSURE: 94 MMHG | HEART RATE: 90 BPM

## 2023-01-18 LAB
B PERT DNA SPEC QL NAA+PROBE: SIGNIFICANT CHANGE UP
B PERT+PARAPERT DNA PNL SPEC NAA+PROBE: SIGNIFICANT CHANGE UP
BORDETELLA PARAPERTUSSIS (RAPRVP): SIGNIFICANT CHANGE UP
C PNEUM DNA SPEC QL NAA+PROBE: SIGNIFICANT CHANGE UP
FLUAV SUBTYP SPEC NAA+PROBE: SIGNIFICANT CHANGE UP
FLUBV RNA SPEC QL NAA+PROBE: SIGNIFICANT CHANGE UP
HADV DNA SPEC QL NAA+PROBE: SIGNIFICANT CHANGE UP
HCOV 229E RNA SPEC QL NAA+PROBE: SIGNIFICANT CHANGE UP
HCOV HKU1 RNA SPEC QL NAA+PROBE: SIGNIFICANT CHANGE UP
HCOV NL63 RNA SPEC QL NAA+PROBE: SIGNIFICANT CHANGE UP
HCOV OC43 RNA SPEC QL NAA+PROBE: SIGNIFICANT CHANGE UP
HMPV RNA SPEC QL NAA+PROBE: SIGNIFICANT CHANGE UP
HPIV1 RNA SPEC QL NAA+PROBE: SIGNIFICANT CHANGE UP
HPIV2 RNA SPEC QL NAA+PROBE: SIGNIFICANT CHANGE UP
HPIV3 RNA SPEC QL NAA+PROBE: SIGNIFICANT CHANGE UP
HPIV4 RNA SPEC QL NAA+PROBE: SIGNIFICANT CHANGE UP
M PNEUMO DNA SPEC QL NAA+PROBE: SIGNIFICANT CHANGE UP
MAGNESIUM SERPL-MCNC: 1.7 MG/DL — SIGNIFICANT CHANGE UP (ref 1.6–2.6)
RAPID RVP RESULT: SIGNIFICANT CHANGE UP
RSV RNA SPEC QL NAA+PROBE: SIGNIFICANT CHANGE UP
RV+EV RNA SPEC QL NAA+PROBE: SIGNIFICANT CHANGE UP
SARS-COV-2 RNA SPEC QL NAA+PROBE: SIGNIFICANT CHANGE UP
TROPONIN T, HIGH SENSITIVITY RESULT: 10 NG/L — SIGNIFICANT CHANGE UP
TROPONIN T, HIGH SENSITIVITY RESULT: 9 NG/L — SIGNIFICANT CHANGE UP
TSH SERPL-MCNC: 1.92 UIU/ML — SIGNIFICANT CHANGE UP (ref 0.27–4.2)

## 2023-01-18 RX ORDER — POTASSIUM CHLORIDE 20 MEQ
40 PACKET (EA) ORAL ONCE
Refills: 0 | Status: COMPLETED | OUTPATIENT
Start: 2023-01-18 | End: 2023-01-18

## 2023-01-18 RX ADMIN — Medication 40 MILLIEQUIVALENT(S): at 01:01

## 2023-01-18 NOTE — ED ADULT NURSE REASSESSMENT NOTE - NS ED NURSE REASSESS COMMENT FT1
Pt is A&Ox4, ambulatory with no complaints at this time. Neuro/sensory intact. Respirations even and unlabored, chest rise equal b/l. NSR on cardiac monitor. Repeat lab collected and sent. Medications administered as ordered, see MAR. Pt denies SOB, palpitations or chest pain. Pt states symptoms have improved. Safety maintained throughout, will continue to monitor.

## 2023-01-24 ENCOUNTER — APPOINTMENT (OUTPATIENT)
Dept: GASTROENTEROLOGY | Facility: CLINIC | Age: 43
End: 2023-01-24

## 2023-01-26 DIAGNOSIS — Z86.39 PERSONAL HISTORY OF OTHER ENDOCRINE, NUTRITIONAL AND METABOLIC DISEASE: ICD-10-CM

## 2023-01-26 DIAGNOSIS — J45.909 UNSPECIFIED ASTHMA, UNCOMPLICATED: ICD-10-CM

## 2023-01-27 NOTE — PROVIDER CONTACT NOTE (CRITICAL VALUE NOTIFICATION) - NS PROVIDER READ BACK
Forms completed, faxed (01-) and sent to scanning.    Loreta Rogers XRO/  
Patient's medication reconciliation is completed by RN today, 01/26/23.  Forms placed in Dr. Cook's basket for signature.  Jami Maxwell RN    Certification period: 12/19/22 to 02/16/23.  
Reason for Call:  Form, our goal is to have forms completed with 72 hours, however, some forms may require a visit or additional information.    Type of letter, form or note:  Home Health Certification    Who is the form from?: Home care    Where did the form come from: form was faxed in    What clinic location was the form placed at?: [facility]    Where the form was placed: Given to MA/MARGIE Alfonso    What number is listed as a contact on the form?: [phone]       Additional comments: [facility]    Call taken on 1/26/2023 at 2:30 PM by Loreta Rogers        
yes
yes

## 2023-02-01 ENCOUNTER — APPOINTMENT (OUTPATIENT)
Dept: CARDIOLOGY | Facility: CLINIC | Age: 43
End: 2023-02-01
Payer: COMMERCIAL

## 2023-02-01 ENCOUNTER — NON-APPOINTMENT (OUTPATIENT)
Age: 43
End: 2023-02-01

## 2023-02-01 VITALS
BODY MASS INDEX: 32.2 KG/M2 | DIASTOLIC BLOOD PRESSURE: 110 MMHG | OXYGEN SATURATION: 98 % | HEIGHT: 71 IN | WEIGHT: 230 LBS | SYSTOLIC BLOOD PRESSURE: 175 MMHG | HEART RATE: 88 BPM

## 2023-02-01 DIAGNOSIS — R00.0 TACHYCARDIA, UNSPECIFIED: ICD-10-CM

## 2023-02-01 DIAGNOSIS — R00.2 PALPITATIONS: ICD-10-CM

## 2023-02-01 DIAGNOSIS — R07.89 OTHER CHEST PAIN: ICD-10-CM

## 2023-02-01 DIAGNOSIS — R94.31 ABNORMAL ELECTROCARDIOGRAM [ECG] [EKG]: ICD-10-CM

## 2023-02-01 DIAGNOSIS — Z13.6 ENCOUNTER FOR SCREENING FOR CARDIOVASCULAR DISORDERS: ICD-10-CM

## 2023-02-01 PROCEDURE — 99204 OFFICE O/P NEW MOD 45 MIN: CPT | Mod: 25

## 2023-02-01 PROCEDURE — 93000 ELECTROCARDIOGRAM COMPLETE: CPT

## 2023-02-09 PROBLEM — R00.0 SINUS TACHYCARDIA: Status: ACTIVE | Noted: 2023-02-01

## 2023-02-09 PROBLEM — R07.89 CHEST DISCOMFORT: Status: ACTIVE | Noted: 2023-02-09

## 2023-02-09 PROBLEM — Z13.6 SCREENING FOR CARDIOVASCULAR CONDITION: Status: ACTIVE | Noted: 2023-02-09

## 2023-02-16 ENCOUNTER — APPOINTMENT (OUTPATIENT)
Dept: CV DIAGNOSTICS | Facility: HOSPITAL | Age: 43
End: 2023-02-16

## 2023-02-16 ENCOUNTER — OUTPATIENT (OUTPATIENT)
Dept: OUTPATIENT SERVICES | Facility: HOSPITAL | Age: 43
LOS: 1 days | End: 2023-02-16
Payer: COMMERCIAL

## 2023-02-16 ENCOUNTER — APPOINTMENT (OUTPATIENT)
Dept: CV DIAGNOSITCS | Facility: HOSPITAL | Age: 43
End: 2023-02-16

## 2023-02-16 DIAGNOSIS — R00.0 TACHYCARDIA, UNSPECIFIED: ICD-10-CM

## 2023-02-16 DIAGNOSIS — R00.2 PALPITATIONS: ICD-10-CM

## 2023-02-16 DIAGNOSIS — Z00.00 ENCOUNTER FOR GENERAL ADULT MEDICAL EXAMINATION WITHOUT ABNORMAL FINDINGS: ICD-10-CM

## 2023-02-16 DIAGNOSIS — R94.31 ABNORMAL ELECTROCARDIOGRAM [ECG] [EKG]: ICD-10-CM

## 2023-02-16 PROCEDURE — 93880 EXTRACRANIAL BILAT STUDY: CPT | Mod: 26

## 2023-02-16 PROCEDURE — 93306 TTE W/DOPPLER COMPLETE: CPT | Mod: 26

## 2023-02-16 PROCEDURE — 93016 CV STRESS TEST SUPVJ ONLY: CPT | Mod: GC

## 2023-02-16 PROCEDURE — 93018 CV STRESS TEST I&R ONLY: CPT | Mod: GC

## 2023-02-17 DIAGNOSIS — I10 ESSENTIAL (PRIMARY) HYPERTENSION: ICD-10-CM

## 2023-02-17 RX ORDER — LOSARTAN POTASSIUM 25 MG/1
25 TABLET, FILM COATED ORAL DAILY
Qty: 1 | Refills: 3 | Status: ACTIVE | COMMUNITY
Start: 2023-02-17 | End: 1900-01-01

## 2023-08-06 ENCOUNTER — EMERGENCY (EMERGENCY)
Facility: HOSPITAL | Age: 43
LOS: 0 days | Discharge: HOME | End: 2023-08-06
Attending: EMERGENCY MEDICINE
Payer: COMMERCIAL

## 2023-08-06 VITALS
WEIGHT: 227.08 LBS | DIASTOLIC BLOOD PRESSURE: 110 MMHG | OXYGEN SATURATION: 98 % | HEART RATE: 118 BPM | RESPIRATION RATE: 18 BRPM | SYSTOLIC BLOOD PRESSURE: 178 MMHG | TEMPERATURE: 97 F

## 2023-08-06 VITALS
SYSTOLIC BLOOD PRESSURE: 145 MMHG | HEART RATE: 96 BPM | OXYGEN SATURATION: 100 % | DIASTOLIC BLOOD PRESSURE: 83 MMHG | RESPIRATION RATE: 18 BRPM

## 2023-08-06 DIAGNOSIS — Z87.19 PERSONAL HISTORY OF OTHER DISEASES OF THE DIGESTIVE SYSTEM: ICD-10-CM

## 2023-08-06 DIAGNOSIS — N50.89 OTHER SPECIFIED DISORDERS OF THE MALE GENITAL ORGANS: ICD-10-CM

## 2023-08-06 DIAGNOSIS — K76.89 OTHER SPECIFIED DISEASES OF LIVER: ICD-10-CM

## 2023-08-06 DIAGNOSIS — Z90.49 ACQUIRED ABSENCE OF OTHER SPECIFIED PARTS OF DIGESTIVE TRACT: ICD-10-CM

## 2023-08-06 DIAGNOSIS — K61.0 ANAL ABSCESS: ICD-10-CM

## 2023-08-06 DIAGNOSIS — J45.909 UNSPECIFIED ASTHMA, UNCOMPLICATED: ICD-10-CM

## 2023-08-06 DIAGNOSIS — K62.89 OTHER SPECIFIED DISEASES OF ANUS AND RECTUM: ICD-10-CM

## 2023-08-06 DIAGNOSIS — Z91.013 ALLERGY TO SEAFOOD: ICD-10-CM

## 2023-08-06 LAB
ALBUMIN SERPL ELPH-MCNC: 4.4 G/DL — SIGNIFICANT CHANGE UP (ref 3.5–5.2)
ALP SERPL-CCNC: 69 U/L — SIGNIFICANT CHANGE UP (ref 30–115)
ALT FLD-CCNC: 23 U/L — SIGNIFICANT CHANGE UP (ref 0–41)
ANION GAP SERPL CALC-SCNC: 14 MMOL/L — SIGNIFICANT CHANGE UP (ref 7–14)
AST SERPL-CCNC: 23 U/L — SIGNIFICANT CHANGE UP (ref 0–41)
BASOPHILS # BLD AUTO: 0.03 K/UL — SIGNIFICANT CHANGE UP (ref 0–0.2)
BASOPHILS NFR BLD AUTO: 0.3 % — SIGNIFICANT CHANGE UP (ref 0–1)
BILIRUB SERPL-MCNC: 0.8 MG/DL — SIGNIFICANT CHANGE UP (ref 0.2–1.2)
BUN SERPL-MCNC: 10 MG/DL — SIGNIFICANT CHANGE UP (ref 10–20)
CALCIUM SERPL-MCNC: 9.5 MG/DL — SIGNIFICANT CHANGE UP (ref 8.4–10.5)
CHLORIDE SERPL-SCNC: 98 MMOL/L — SIGNIFICANT CHANGE UP (ref 98–110)
CO2 SERPL-SCNC: 26 MMOL/L — SIGNIFICANT CHANGE UP (ref 17–32)
CREAT SERPL-MCNC: 1.1 MG/DL — SIGNIFICANT CHANGE UP (ref 0.7–1.5)
EGFR: 86 ML/MIN/1.73M2 — SIGNIFICANT CHANGE UP
EOSINOPHIL # BLD AUTO: 0.12 K/UL — SIGNIFICANT CHANGE UP (ref 0–0.7)
EOSINOPHIL NFR BLD AUTO: 1.2 % — SIGNIFICANT CHANGE UP (ref 0–8)
GLUCOSE SERPL-MCNC: 92 MG/DL — SIGNIFICANT CHANGE UP (ref 70–99)
HCT VFR BLD CALC: 51.2 % — SIGNIFICANT CHANGE UP (ref 42–52)
HGB BLD-MCNC: 17.1 G/DL — SIGNIFICANT CHANGE UP (ref 14–18)
IMM GRANULOCYTES NFR BLD AUTO: 0.2 % — SIGNIFICANT CHANGE UP (ref 0.1–0.3)
LACTATE SERPL-SCNC: 2.1 MMOL/L — HIGH (ref 0.7–2)
LIDOCAIN IGE QN: 20 U/L — SIGNIFICANT CHANGE UP (ref 7–60)
LYMPHOCYTES # BLD AUTO: 0.99 K/UL — LOW (ref 1.2–3.4)
LYMPHOCYTES # BLD AUTO: 10.3 % — LOW (ref 20.5–51.1)
MCHC RBC-ENTMCNC: 26.4 PG — LOW (ref 27–31)
MCHC RBC-ENTMCNC: 33.4 G/DL — SIGNIFICANT CHANGE UP (ref 32–37)
MCV RBC AUTO: 79.1 FL — LOW (ref 80–94)
MONOCYTES # BLD AUTO: 0.82 K/UL — HIGH (ref 0.1–0.6)
MONOCYTES NFR BLD AUTO: 8.5 % — SIGNIFICANT CHANGE UP (ref 1.7–9.3)
NEUTROPHILS # BLD AUTO: 7.66 K/UL — HIGH (ref 1.4–6.5)
NEUTROPHILS NFR BLD AUTO: 79.5 % — HIGH (ref 42.2–75.2)
NRBC # BLD: 0 /100 WBCS — SIGNIFICANT CHANGE UP (ref 0–0)
PLATELET # BLD AUTO: 318 K/UL — SIGNIFICANT CHANGE UP (ref 130–400)
PMV BLD: 9.3 FL — SIGNIFICANT CHANGE UP (ref 7.4–10.4)
POTASSIUM SERPL-MCNC: 3.7 MMOL/L — SIGNIFICANT CHANGE UP (ref 3.5–5)
POTASSIUM SERPL-SCNC: 3.7 MMOL/L — SIGNIFICANT CHANGE UP (ref 3.5–5)
PROT SERPL-MCNC: 7.8 G/DL — SIGNIFICANT CHANGE UP (ref 6–8)
RBC # BLD: 6.47 M/UL — HIGH (ref 4.7–6.1)
RBC # FLD: 16.1 % — HIGH (ref 11.5–14.5)
SODIUM SERPL-SCNC: 138 MMOL/L — SIGNIFICANT CHANGE UP (ref 135–146)
WBC # BLD: 9.64 K/UL — SIGNIFICANT CHANGE UP (ref 4.8–10.8)
WBC # FLD AUTO: 9.64 K/UL — SIGNIFICANT CHANGE UP (ref 4.8–10.8)

## 2023-08-06 PROCEDURE — 36415 COLL VENOUS BLD VENIPUNCTURE: CPT

## 2023-08-06 PROCEDURE — 87205 SMEAR GRAM STAIN: CPT

## 2023-08-06 PROCEDURE — 87077 CULTURE AEROBIC IDENTIFY: CPT

## 2023-08-06 PROCEDURE — 96375 TX/PRO/DX INJ NEW DRUG ADDON: CPT | Mod: XU

## 2023-08-06 PROCEDURE — 99285 EMERGENCY DEPT VISIT HI MDM: CPT

## 2023-08-06 PROCEDURE — 87186 SC STD MICRODIL/AGAR DIL: CPT

## 2023-08-06 PROCEDURE — 99284 EMERGENCY DEPT VISIT MOD MDM: CPT | Mod: 25

## 2023-08-06 PROCEDURE — 87070 CULTURE OTHR SPECIMN AEROBIC: CPT

## 2023-08-06 PROCEDURE — 83605 ASSAY OF LACTIC ACID: CPT

## 2023-08-06 PROCEDURE — 96376 TX/PRO/DX INJ SAME DRUG ADON: CPT | Mod: XU

## 2023-08-06 PROCEDURE — 80053 COMPREHEN METABOLIC PANEL: CPT

## 2023-08-06 PROCEDURE — 85025 COMPLETE CBC W/AUTO DIFF WBC: CPT

## 2023-08-06 PROCEDURE — 74177 CT ABD & PELVIS W/CONTRAST: CPT | Mod: MA

## 2023-08-06 PROCEDURE — 46050 I&D PERIANAL ABSCESS SUPFC: CPT

## 2023-08-06 PROCEDURE — 83690 ASSAY OF LIPASE: CPT

## 2023-08-06 PROCEDURE — 96374 THER/PROPH/DIAG INJ IV PUSH: CPT | Mod: XU

## 2023-08-06 PROCEDURE — 74177 CT ABD & PELVIS W/CONTRAST: CPT | Mod: 26,MA

## 2023-08-06 RX ORDER — DIPHENHYDRAMINE HCL 50 MG
50 CAPSULE ORAL ONCE
Refills: 0 | Status: COMPLETED | OUTPATIENT
Start: 2023-08-06 | End: 2023-08-06

## 2023-08-06 RX ORDER — ONDANSETRON 8 MG/1
4 TABLET, FILM COATED ORAL ONCE
Refills: 0 | Status: COMPLETED | OUTPATIENT
Start: 2023-08-06 | End: 2023-08-06

## 2023-08-06 RX ORDER — HYDROMORPHONE HYDROCHLORIDE 2 MG/ML
1 INJECTION INTRAMUSCULAR; INTRAVENOUS; SUBCUTANEOUS ONCE
Refills: 0 | Status: DISCONTINUED | OUTPATIENT
Start: 2023-08-06 | End: 2023-08-06

## 2023-08-06 RX ORDER — CEFEPIME 1 G/1
2000 INJECTION, POWDER, FOR SOLUTION INTRAMUSCULAR; INTRAVENOUS ONCE
Refills: 0 | Status: COMPLETED | OUTPATIENT
Start: 2023-08-06 | End: 2023-08-06

## 2023-08-06 RX ORDER — LIDOCAINE 4 G/100G
10 CREAM TOPICAL ONCE
Refills: 0 | Status: COMPLETED | OUTPATIENT
Start: 2023-08-06 | End: 2023-08-06

## 2023-08-06 RX ORDER — MORPHINE SULFATE 50 MG/1
6 CAPSULE, EXTENDED RELEASE ORAL ONCE
Refills: 0 | Status: DISCONTINUED | OUTPATIENT
Start: 2023-08-06 | End: 2023-08-06

## 2023-08-06 RX ORDER — MORPHINE SULFATE 50 MG/1
2 CAPSULE, EXTENDED RELEASE ORAL ONCE
Refills: 0 | Status: DISCONTINUED | OUTPATIENT
Start: 2023-08-06 | End: 2023-08-06

## 2023-08-06 RX ADMIN — HYDROMORPHONE HYDROCHLORIDE 1 MILLIGRAM(S): 2 INJECTION INTRAMUSCULAR; INTRAVENOUS; SUBCUTANEOUS at 11:03

## 2023-08-06 RX ADMIN — CEFEPIME 100 MILLIGRAM(S): 1 INJECTION, POWDER, FOR SOLUTION INTRAMUSCULAR; INTRAVENOUS at 12:15

## 2023-08-06 RX ADMIN — ONDANSETRON 4 MILLIGRAM(S): 8 TABLET, FILM COATED ORAL at 09:10

## 2023-08-06 RX ADMIN — MORPHINE SULFATE 6 MILLIGRAM(S): 50 CAPSULE, EXTENDED RELEASE ORAL at 09:14

## 2023-08-06 RX ADMIN — MORPHINE SULFATE 2 MILLIGRAM(S): 50 CAPSULE, EXTENDED RELEASE ORAL at 10:59

## 2023-08-06 RX ADMIN — MORPHINE SULFATE 6 MILLIGRAM(S): 50 CAPSULE, EXTENDED RELEASE ORAL at 08:53

## 2023-08-06 RX ADMIN — HYDROMORPHONE HYDROCHLORIDE 1 MILLIGRAM(S): 2 INJECTION INTRAMUSCULAR; INTRAVENOUS; SUBCUTANEOUS at 13:16

## 2023-08-06 RX ADMIN — HYDROMORPHONE HYDROCHLORIDE 1 MILLIGRAM(S): 2 INJECTION INTRAMUSCULAR; INTRAVENOUS; SUBCUTANEOUS at 11:58

## 2023-08-06 RX ADMIN — MORPHINE SULFATE 2 MILLIGRAM(S): 50 CAPSULE, EXTENDED RELEASE ORAL at 09:42

## 2023-08-06 RX ADMIN — Medication 50 MILLIGRAM(S): at 09:13

## 2023-08-06 RX ADMIN — Medication 125 MILLIGRAM(S): at 09:14

## 2023-08-06 RX ADMIN — LIDOCAINE 10 MILLILITER(S): 4 CREAM TOPICAL at 08:45

## 2023-08-06 NOTE — ED PROVIDER NOTE - PROGRESS NOTE DETAILS
Ct scan results noted, Sx PATTI eastman aware and will see patient.    Patient advised of hepatic hypodensity and calcification in scrotum and need for outpatient MRI/scrotal sono. Patient given copies of results . Seen by sx team. Recommends dc home with abx, f/u with Dr Jade in 1 week

## 2023-08-06 NOTE — ED ADULT NURSE NOTE - NSFALLUNIVINTERV_ED_ALL_ED
Bed/Stretcher in lowest position, wheels locked, appropriate side rails in place/Call bell, personal items and telephone in reach/Instruct patient to call for assistance before getting out of bed/chair/stretcher/Non-slip footwear applied when patient is off stretcher/[place] to call system/Physically safe environment - no spills, clutter or unnecessary equipment/Purposeful proactive rounding/Room/bathroom lighting operational, light cord in reach

## 2023-08-06 NOTE — ED ADULT NURSE NOTE - NSICDXPASTMEDICALHX_GEN_ALL_CORE_FT
PAST MEDICAL HISTORY:  No pertinent past medical history      PAST MEDICAL HISTORY:  Gastric ulcer     H/O hemorrhoidectomy     History of surgery on arm

## 2023-08-06 NOTE — ED PROVIDER NOTE - ATTENDING APP SHARED VISIT CONTRIBUTION OF CARE
I have personally performed a history and physical exam on this patient and personally directed the management of the patient. Patient is a 43-year-old male status post hemorrhoidectomy several years prior presents for evaluation of worsening rectal pain over the past 4 days denies any bright red blood per rectum denies any other abdominal pain or back pain denies fevers chills or vomiting    On physical exam patient is normocephalic atraumatic pupils equal round reactive light accommodation extraocular muscles intact oropharynx clear chest clear to auscultation bilaterally abdomen soft nontender nondistended bowel sounds positive no guarding or rebound rectal exam reveals tenderness to palpation in the superior aspect the 12 o'clock position of the rectum in addition has a very mild nonthrombosed external hemorrhoid not tender to palpation no other extremity abnormalities patient is able ambulate well    Assessment plan given this patient's severe rectal pain and combination with physical exam findings we obtained CT to evaluate for rectal perirectal abscess patient given IV pain medicine topical pain medicine I will continue to monitor at this time

## 2023-08-06 NOTE — CONSULT NOTE ADULT - ASSESSMENT
.  Impression:  Patient is a 44 y/o male with past medical history of childhood Asthma, Gastric ulcers 15 years ago (s/p clipping) and s/p hemorrhoidectomy 1/2022 in [place] who presents with a 4 day history of progressively worsening right sided rectal pain and swelling that started Thursday 8/3/2023.  Patient reports started as mild soreness that he felt when he wiped after BM's but then he noticed it getting progressively worse.  Now reports progressed to severe sharp pain, 10/10 on pain scale to right proximal gluteal fold exacerbated by movement, palpation, sitting and standing.  Patient reports no relieving factors.  Reports no significant relief with Morphine given in the ER.  Patient now with CT scan findings of an Anterior inferior right medial gluteal fold, there is an air-fluid collection measuring 3.5 x 2.0 x 3.5 cm concerning for abscess.          Plan:  # Anterior inferior right medial gluteal fold, there is an air-fluid collection measuring 3.5 x 2.0 x 3.5 cm concerning for abscess.  - Case d/w Dr. Jade and he reviewed CT scan and states  .  Impression:  Patient is a 42 y/o male with past medical history of childhood Asthma, Gastric ulcers 15 years ago (s/p clipping) and s/p hemorrhoidectomy 1/2022 in [place] who presents with a 4 day history of progressively worsening right sided rectal pain and swelling that started Thursday 8/3/2023.  Patient reports started as mild soreness that he felt when he wiped after BM's but then he noticed it getting progressively worse.  Now reports progressed to severe sharp pain, 10/10 on pain scale to right proximal gluteal fold exacerbated by movement, palpation, sitting and standing.  Patient reports no relieving factors.  Reports no significant relief with Morphine given in the ER.  Patient now with CT scan findings of an Anterior inferior right medial gluteal fold, there is an air-fluid collection measuring 3.5 x 2.0 x 3.5 cm concerning for abscess.          Plan:  # Anterior inferior right medial gluteal fold, there is an air-fluid collection measuring 3.5 x 2.0 x 3.5 cm concerning for abscess.  - Case d/w Dr. Jade and he reviewed CT scan and states can offer a bedside I&D since it is a small collection on CT scan or if patient wishes, can take to operating room to do a formal I&D.  Dr. Jade states once abscess I&D'd and he tolerates well, he can be discharged home from a surgical perspective with PO Augmentin x 1 week, remove outer dressing with his first BM and then do sitz baths and if packing comes out, do not have to replace and then patient to follow up with him on Thursday of this week.       - Patient verbalizes understanding of the and risk /benefits and states would prefer a bed side I&D.    - Surgery resident Dr. Nunez discussed risks / benefits / Alternative treatment options and preformed bedside I&D and patient tolerated well.  (See procedure note).     - Patient instructed on Dr. Jade's discharge plans of Augmentin x 1 week, remove outer dressing with first BM, Sitz baths throughout the day, if packing comes out, then may leave out and to call office on Monday to confirm appointment with Dr. Jade for this Thursday 8/10/2023.  - Patient verbalizes understanding of plan & instructions and all questions answered to patient's satisfaction.     - Dr. Jade's plan relayed to ER PA.

## 2023-08-06 NOTE — CONSULT NOTE ADULT - SUBJECTIVE AND OBJECTIVE BOX
.  Patient is a 42 y/o male with past medical history of childhood Asthma, Gastric ulcers 15 years ago (s/p clipping) and s/p hemorrhoidectomy 1/2022 in [place] who presents with a 4 day history of progressively worsening right sided rectal pain and swelling that started Thursday 8/3/2023.  Patient reports started as mild soreness that he felt when he wiped after BM's but then he noticed it getting progressively worse.  Now reports progressed to severe sharp pain, 10/10 on pain scale to right proximal gluteal fold exacerbated by movement, palpation, sitting and standing.  Patient reports no relieving factors.  Reports no significant relief with Morphine given in the ER.  Patient reports he felt feverish with chills at times but never took his temperatures.   Reports having normal BM's with last BM yesterday.   Patient reports this is his first episode of an abscess and never had any issues after his hemorrhoidectomy.    Patient denies any hematochezia, melena, hematemesis, N/V/D/C, urinary complaints or abdominal pain.              Medical History:  Gastric ulcers 15 years ago, s/p clipping.    H/O Hemorrhoids    Childhood Asthma (no recent attacks in years)        Surgery History:  H/O hemorrhoidectomy  (Jan. 2022 in [place])    H/O Gastric ulcer clipping (15 years ago).     H/O Left biceps tendon repair        Home Medications:  Polyethylene glycol 3350 oral powder for reconstitution: 17 gram(s) orally once a day (31 Jan 2022 14:58)        Allergies  Shellfish. (Anaphylaxis (Severe))        SOCIAL HISTORY:  Tobacco use:  Smokes 3-4 Cigarettes daily for over 20 years.  Alcohol use:  1-2 drinks per day after work.  Drug use:  Denies use.        FAMILY HISTORY:  No pertinent family history in first degree relatives        REVIEW OF SYSTEMS:  CONSTITUTIONAL:  + Subjective fever and chills.  No weakness.    EYES/ENT:  No visual changes;  No vertigo or throat pain   NECK:  No pain or stiffness  RESPIRATORY:  No cough, wheezing, hemoptysis; No shortness of breath  CARDIOVASCULAR:  No chest pain or palpitations  GASTROINTESTINAL:  + Right rectal pain.  No abdominal or epigastric pain.  No nausea, vomiting, or hematemesis;  o diarrhea or constipation.  No melena or hematochezia.  GENITOURINARY:  No dysuria, frequency or hematuria  MUSCULOSKELETAL:  FROM all extremities, normal strength, No calf tenderness  NEUROLOGICAL:  No numbness or weakness  SKIN:  No itching, rashes          Vital Signs Last 24 Hrs  T(C): 36.3 (06 Aug 2023 07:55), Max: 36.3 (06 Aug 2023 07:55)  T(F): 97.4 (06 Aug 2023 07:55), Max: 97.4 (06 Aug 2023 07:55)  HR: 79 (06 Aug 2023 09:45) (79 - 118)  BP: 167/87 (06 Aug 2023 09:45) (167/87 - 178/110)  RR: 18 (06 Aug 2023 09:45) (18 - 18)  SpO2: 100% (06 Aug 2023 09:45) (98% - 100%)    Parameters below as of 06 Aug 2023 09:45  Patient On (Oxygen Delivery Method): room air          Physical Exam:  General:  WD, WN, male conversant in mild distress from rectal pain.  Skin:   Right gluteal fold without erythema or increased warmth,  No active discharge.  Remaining skin warm, dry with good color and turgor.  No rash or ulcers.   HEENT:   NC/AT, EOMI, normal hearing, no oral lesions, no LAD, neck supple.  Pulmonary:   CTA B/L, Normal respiratory effort. No W/R/R.   Cardiovascular:   S1 & S2, RRR, No murmurs, rubs or gallops.  Abdominal:  + BS, soft, ND/NT, No rebound, guarding or peritoneal signs.  Rectal:  External exam due to severe pain,  no hemorrhoids, no bleeding or discharge, + Fullness about 3 cm to proximal right gluteal fold, no erythema, no induration, no bleeding or discharge.  ++ Tenderness to palpation.  No pain with left gluteal area.   Genitourinary:  No suprapubic tenderness, No CVAT.   Extremities:   Normal strength, no clubbing/cyanosis/edema.  Palpable distal pulses.   Neuro:  Awake, alert & oriented x 3,  CNs II-XII grossly intact, normal sensation, no focal deficits.          LABS:                        17.1   9.64  )-----------( 318      ( 06 Aug 2023 08:45 )             51.2     08-06    138  |  98  |  10  ----------------------------<  92  3.7   |  26  |  1.1    Ca    9.5      06 Aug 2023 08:45    TPro  7.8  /  Alb  4.4  /  TBili  0.8  /  DBili  x   /  AST  23  /  ALT  23  /  AlkPhos  69  08-06      Urinalysis Basic - ( 06 Aug 2023 08:45 )    Color: x / Appearance: x / SG: x / pH: x  Gluc: 92 mg/dL / Ketone: x  / Bili: x / Urobili: x   Blood: x / Protein: x / Nitrite: x   Leuk Esterase: x / RBC: x / WBC x   Sq Epi: x / Non Sq Epi: x / Bacteria: x          CT Abdomen and Pelvis w/ IV Cont (08.06.23 @ 10:07)   ACC: [id number] EXAM:  CT ABDOMEN AND PELVIS IC   ORDERED BY: CHANELLE CRAMER     PROCEDURE DATE:  08/06/2023      INTERPRETATION:  CLINICAL STATEMENT: Rectal pain    TECHNIQUE: Contiguous axial CT images were obtained from the lower chest   to the pubic symphysis after 90 cc of Omnipaque 350 intravenous contrast.    Oral contrast wasn't given.  Reformatted images in the coronal and   sagittal planes were acquired.    COMPARISON CT: None.    OTHER STUDIES USED FOR CORRELATION: CT chest 1/23/2022      FINDINGS:    LOWER CHEST: Atelectasis    HEPATOBILIARY: Focal indeterminate left hepatic hypodensity on image 32   series 301. Outpatient MRI with gadolinium is recommended for further   evaluation. The gallbladder is present..    SPLEEN: Unremarkable..    PANCREAS: Unremarkable..    ADRENAL GLANDS: Unremarkable..    KIDNEYS: Unremarkable..    ABDOMINOPELVIC NODES: Unremarkable..    PELVIC ORGANS: The bladder is collapsed. There is partially imaged   calcification within the partially imagedleft hemiscrotum outpatient   scrotal ultrasound is recommended for further evaluation.    PERITONEUM/MESENTERY/BOWEL: There is no free air or obstruction. The   appendix is not seen, but there are no secondary signs of acute   appendicitis. At the anterior inferior right medial gluteal fold there is   an air-fluid collection measuring 3.5 x 2.0 x 3.5 cm concerning for   abscess. It is unclear on this examination if there is any communication   with the rectum..    BONES/SOFT TISSUES: Degenerative and chronic changes...    OTHER: .The abdominal aorta is normal in caliber. There are vascular   calcifications. There is nonspecific dilatation of the left external   iliac vein..      IMPRESSION:    At the anterior inferior right medial gluteal fold, there is an air-fluid   collection measuring 3.5 x 2.0 x 3.5 cm concerning for abscess.    It is unclear on this examination if there is any communication with the   rectum..    Focal indeterminate left hepatic hypodensity. Outpatient MRI with   gadolinium is recommended for further evaluation.    Partially imaged calcification within the partially imaged left   hemiscrotum    Outpatient scrotal ultrasound is recommended for further evaluation.    --- End of Report ---

## 2023-08-06 NOTE — ED PROVIDER NOTE - PROVIDER TOKENS
FREE:[LAST:[your PMD],PHONE:[(   )    -],FAX:[(   )    -],FOLLOWUP:[1-3 Days]],PROVIDER:[TOKEN:[[id number]],FOLLOWUP:[4-6 Days]]

## 2023-08-06 NOTE — ED PROVIDER NOTE - PATIENT PORTAL LINK FT
You can access the FollowMyHealth Patient Portal offered by [facility] by registering at the following website: [email or web address]. By joining [employer]’s FollowMyHealth portal, you will also be able to view your health information using other applications (apps) compatible with our system.

## 2023-08-06 NOTE — ED PROVIDER NOTE - OBJECTIVE STATEMENT
42 yo M hx of hemorrhoidectomy c/o  rectal pain  x 4 days. Patient noticed swelling to right side of rectal area. No f/c/n/v.

## 2023-08-06 NOTE — ED PROVIDER NOTE - CLINICAL SUMMARY MEDICAL DECISION MAKING FREE TEXT BOX
given this patient's severe rectal pain and combination with physical exam findings we obtained CT to evaluate for rectal perirectal abscess patient given IV pain medicine topical pain medicine I will continue to monitor at this time

## 2023-08-06 NOTE — ED PROVIDER NOTE - NS ED ROS FT
Constitutional: (-) fever  GI: (-) abd pain, (+) rectal pain  Skin: (-) rash  Muskuloskeletal: (-) swelling  Neurological: (-) altered mental status

## 2023-08-06 NOTE — ED PROVIDER NOTE - PHYSICAL EXAMINATION
Gen: Alert, NAD, well appearing  Head: NC, AT, PERRL, EOMI, normal lids/conjunctiva  Rectal: +tender to palpation right side of perianal area. No swelling or hemorrhoids noted  Skin: no rash, warm/dry  Neuro: AAOx3, no sensory/motor deficits

## 2023-08-06 NOTE — ED PROVIDER NOTE - CARE PROVIDER_API CALL
your PMD,   Phone: (   )    -  Fax: (   )    -  Follow Up Time: 1-3 Days    Abilio Jade  Surgery  [address] [place] [id number]  Phone: [phone]  Fax: [phone]  Follow Up Time: 4-6 Days

## 2023-08-07 LAB
GRAM STN FLD: SIGNIFICANT CHANGE UP
SPECIMEN SOURCE: SIGNIFICANT CHANGE UP

## 2023-08-08 PROBLEM — Z98.890 OTHER SPECIFIED POSTPROCEDURAL STATES: Chronic | Status: ACTIVE | Noted: 2023-08-06

## 2023-08-08 PROBLEM — K25.9 GASTRIC ULCER, UNSPECIFIED AS ACUTE OR CHRONIC, WITHOUT HEMORRHAGE OR PERFORATION: Chronic | Status: ACTIVE | Noted: 2023-08-06

## 2023-08-08 LAB
-  AMIKACIN: SIGNIFICANT CHANGE UP
-  AMOXICILLIN/CLAVULANIC ACID: SIGNIFICANT CHANGE UP
-  AMPICILLIN/SULBACTAM: SIGNIFICANT CHANGE UP
-  AMPICILLIN: SIGNIFICANT CHANGE UP
-  AMPICILLIN: SIGNIFICANT CHANGE UP
-  AZTREONAM: SIGNIFICANT CHANGE UP
-  CEFAZOLIN: SIGNIFICANT CHANGE UP
-  CEFEPIME: SIGNIFICANT CHANGE UP
-  CEFOXITIN: SIGNIFICANT CHANGE UP
-  CEFTRIAXONE: SIGNIFICANT CHANGE UP
-  CIPROFLOXACIN: SIGNIFICANT CHANGE UP
-  ERTAPENEM: SIGNIFICANT CHANGE UP
-  GENTAMICIN: SIGNIFICANT CHANGE UP
-  IMIPENEM: SIGNIFICANT CHANGE UP
-  LEVOFLOXACIN: SIGNIFICANT CHANGE UP
-  MEROPENEM: SIGNIFICANT CHANGE UP
-  PIPERACILLIN/TAZOBACTAM: SIGNIFICANT CHANGE UP
-  TETRACYCLINE: SIGNIFICANT CHANGE UP
-  TOBRAMYCIN: SIGNIFICANT CHANGE UP
-  TRIMETHOPRIM/SULFAMETHOXAZOLE: SIGNIFICANT CHANGE UP
-  VANCOMYCIN: SIGNIFICANT CHANGE UP
CULTURE RESULTS: SIGNIFICANT CHANGE UP
CULTURE RESULTS: SIGNIFICANT CHANGE UP
METHOD TYPE: SIGNIFICANT CHANGE UP
METHOD TYPE: SIGNIFICANT CHANGE UP
ORGANISM # SPEC MICROSCOPIC CNT: SIGNIFICANT CHANGE UP
SPECIMEN SOURCE: SIGNIFICANT CHANGE UP

## 2023-08-14 ENCOUNTER — APPOINTMENT (OUTPATIENT)
Dept: SURGERY | Facility: CLINIC | Age: 43
End: 2023-08-14
Payer: COMMERCIAL

## 2023-08-14 VITALS
BODY MASS INDEX: 31.5 KG/M2 | HEART RATE: 71 BPM | HEIGHT: 71 IN | WEIGHT: 225 LBS | SYSTOLIC BLOOD PRESSURE: 124 MMHG | OXYGEN SATURATION: 92 % | DIASTOLIC BLOOD PRESSURE: 70 MMHG | TEMPERATURE: 98 F

## 2023-08-14 PROCEDURE — 99203 OFFICE O/P NEW LOW 30 MIN: CPT

## 2023-08-14 PROCEDURE — 99213 OFFICE O/P EST LOW 20 MIN: CPT

## 2023-08-14 NOTE — HISTORY OF PRESENT ILLNESS
[de-identified] : This patient presented to the emergency room little over a week ago with a painful right perianal mass.  Incision and drainage was performed.  The patient reports that he has been draining a fair amount of purulent fluid over the several days after that which has subsequently subsided.  He reports that the pain has also subsided.  He was given a course of antibiotics.  He denies any constipation.  He does have a history of hemorrhoidectomy in the past.

## 2023-08-14 NOTE — REASON FOR VISIT
[Initial Eval - Existing Diagnosis] : an initial evaluation of an existing diagnosis [FreeTextEntry1] : Perianal abscess

## 2023-08-14 NOTE — PHYSICAL EXAM
[de-identified] : Exam is soft, nondistended and nontender. [de-identified] : He has a right perianal abscess site with about a 1 cm residual opening which is clean with no evidence with no evidence of active infection and no active drainage.

## 2023-08-14 NOTE — ASSESSMENT
[FreeTextEntry1] : This patient has a right perianal abscess status post incision and drainage in the ER a little over a week ago and has completed a course of antibiotics.  This is almost completely healed at this time.  He has never had an abscess here before.  I instructed him that no further surgical follow-up is needed at this time.  If this continues to bother him or does not heal within the next couple of weeks he should come back for further evaluation.  Otherwise he can follow-up with his primary care doctor.

## 2024-01-25 NOTE — DIETITIAN INITIAL EVALUATION ADULT. - PROBLEM/PLAN-4
Stable  Reports occasional pain, taking norco sparingly for pain.   Had staples removed at last surgery with vascular. Has one left.   Will be seeing vascular clinic next week for removal of staple and follow up   Upcoming visit for prosthesis in two weeks  Continue tylenol for mild-moderate pain, norco for severe pain  Follow up as instructed    DISPLAY PLAN FREE TEXT

## (undated) DEVICE — VENODYNE/SCD SLEEVE CALF MEDIUM

## (undated) DEVICE — BIOPSY FORCEP RADIAL JAW 4 STANDARD WITH NEEDLE

## (undated) DEVICE — WARMING BLANKET UPPER ADULT

## (undated) DEVICE — LINE BREATHE SAMPLNG

## (undated) DEVICE — POSITIONER FOAM EGG CRATE ULNAR 2PCS (PINK)

## (undated) DEVICE — SUT ETHIBOND 0 30" CT-1 GREEN

## (undated) DEVICE — SUT PDS II 1 48" TP-1

## (undated) DEVICE — ELCTR BOVIE TIP BLADE INSULATED 2.8" EDGE WITH SAFETY

## (undated) DEVICE — SOL IRR POUR NS 0.9% 500ML

## (undated) DEVICE — PACK MINOR WITH LAP

## (undated) DEVICE — GOWN LG

## (undated) DEVICE — LUBRICATING JELLY HR ONE SHOT 3G

## (undated) DEVICE — SUCTION YANKAUER OPEN TIP NO VENT CURVE

## (undated) DEVICE — ELCTR BOVIE BLADE 3/4" EXTENDED LENGTH 6"

## (undated) DEVICE — DRSG TAPE MEDIPORE 6"

## (undated) DEVICE — SUT VICRYL 2-0 27" SH UNDYED

## (undated) DEVICE — CANISTER DISPOSABLE THIN WALL 3000CC

## (undated) DEVICE — PACK IV START WITH CHG

## (undated) DEVICE — CONTAINER FORMALIN 10% 20ML

## (undated) DEVICE — ANESTHESIA CIRCUIT ADULT HMEF

## (undated) DEVICE — TUBING SUCTION NONCONDUCTIVE 6MM X 12FT

## (undated) DEVICE — ELCTR GROUNDING PAD ADULT COVIDIEN

## (undated) DEVICE — SALIVA EJECTOR (BLUE)

## (undated) DEVICE — LABELS BLANK W PEN

## (undated) DEVICE — DRSG BANDAID 0.75X3"

## (undated) DEVICE — ELCTR ECG CONDUCTIVE ADHESIVE

## (undated) DEVICE — TUBING IV SET GRAVITY 3Y 100" MACRO

## (undated) DEVICE — TAPE SILK 2"

## (undated) DEVICE — BIOPSY FORCEP COLD DISP

## (undated) DEVICE — TUBING MEDI-VAC W MAXIGRIP CONNECTORS 1/4"X6'

## (undated) DEVICE — FACESHIELD FULL VISOR

## (undated) DEVICE — SUT MONOCRYL 4-0 27" PS-2 UNDYED

## (undated) DEVICE — DRAPE LAPAROTOMY TRANSVERSE

## (undated) DEVICE — CONTAINER FORMALIN 80ML YELLOW

## (undated) DEVICE — DRSG CURITY GAUZE SPONGE 4 X 4" 12-PLY NON-STERILE

## (undated) DEVICE — DRSG 2X2

## (undated) DEVICE — BASIN EMESIS 10IN GRADUATED MAUVE

## (undated) DEVICE — SOL IRR POUR H2O 250ML

## (undated) DEVICE — CATH IV SAFE BC 22G X 1" (BLUE)

## (undated) DEVICE — SUT MONOCRYL 4-0 18" P-3 UNDYED